# Patient Record
Sex: MALE | Race: OTHER
[De-identification: names, ages, dates, MRNs, and addresses within clinical notes are randomized per-mention and may not be internally consistent; named-entity substitution may affect disease eponyms.]

---

## 2019-04-01 ENCOUNTER — HOSPITAL ENCOUNTER (INPATIENT)
Dept: HOSPITAL 80 - FED | Age: 59
LOS: 9 days | Discharge: HOME | DRG: 853 | End: 2019-04-10
Attending: INTERNAL MEDICINE | Admitting: INTERNAL MEDICINE
Payer: COMMERCIAL

## 2019-04-01 DIAGNOSIS — E11.65: ICD-10-CM

## 2019-04-01 DIAGNOSIS — J96.01: ICD-10-CM

## 2019-04-01 DIAGNOSIS — E78.5: ICD-10-CM

## 2019-04-01 DIAGNOSIS — E87.6: ICD-10-CM

## 2019-04-01 DIAGNOSIS — Z79.4: ICD-10-CM

## 2019-04-01 DIAGNOSIS — D62: ICD-10-CM

## 2019-04-01 DIAGNOSIS — E87.1: ICD-10-CM

## 2019-04-01 DIAGNOSIS — J86.9: ICD-10-CM

## 2019-04-01 DIAGNOSIS — I10: ICD-10-CM

## 2019-04-01 DIAGNOSIS — A41.9: Primary | ICD-10-CM

## 2019-04-01 LAB — PLATELET # BLD: 278 10^3/UL (ref 150–400)

## 2019-04-01 PROCEDURE — G0378 HOSPITAL OBSERVATION PER HR: HCPCS

## 2019-04-01 RX ADMIN — HYDROCODONE BITARTRATE AND ACETAMINOPHEN PRN TAB: 5; 325 TABLET ORAL at 18:34

## 2019-04-01 RX ADMIN — GUAIFENESIN SCH MG: 600 TABLET, EXTENDED RELEASE ORAL at 19:47

## 2019-04-01 RX ADMIN — HYDROCODONE BITARTRATE AND ACETAMINOPHEN PRN TAB: 5; 325 TABLET ORAL at 18:01

## 2019-04-01 RX ADMIN — ACETAMINOPHEN PRN MG: 325 TABLET ORAL at 13:21

## 2019-04-01 RX ADMIN — KETOROLAC TROMETHAMINE PRN MG: 15 INJECTION, SOLUTION INTRAMUSCULAR; INTRAVENOUS at 19:47

## 2019-04-01 RX ADMIN — INSULIN LISPRO SCH: 100 INJECTION, SOLUTION INTRAVENOUS; SUBCUTANEOUS at 18:23

## 2019-04-01 RX ADMIN — HYDROCODONE BITARTRATE AND ACETAMINOPHEN PRN TAB: 5; 325 TABLET ORAL at 22:34

## 2019-04-01 NOTE — EDPHY
H & P


Stated Complaint: high blood sugar, rapid heart rate, fevers, feels weak and 

shakey


Time Seen by Provider: 04/01/19 08:26


HPI/ROS: 





CHIEF COMPLAINT:  Fever, shortness of breath





HISTORY OF PRESENT ILLNESS:  58-year-old male with diabetes presents with fever 

and shortness of breath.  Onset of left chest pain 10 days ago, associated with 

intermittent fever.  Gradually increasing shortness of breath.  Now short of 

breath at rest.  The left chest pain is severe, constant, 10/10 and increases 

with deep inspiration.  No cough.  Recently added insulin to DM regimen.  





REVIEW OF SYSTEMS:  complete 10 point ROS reviewed and is negative except for 

the noted elements in the HPI








- Personal History


Current Tetanus/Diphtheria Vaccine: Unsure


Current Tetanus Diphtheria and Acellular Pertussis (TDAP): Unsure





- Medical/Surgical History


Hx Asthma: No


Hx Chronic Respiratory Disease: No


Hx Diabetes: Yes


Hx Cardiac Disease: No


Hx Renal Disease: No


Hx Cirrhosis: No


Hx Alcoholism: No


Hx HIV/AIDS: No


Hx Splenectomy or Spleen Trauma: No





- Social History


Smoking Status: Never smoked


Alcohol Use: Sober


Drug Use: None





- Physical Exam


Exam: 





General Appearance:  Alert, pleasant


Eyes:  Pupils equal and round, no conjunctival pallor or injection


ENT, Mouth:  Mucous membranes moist


Neck:  Normal inspection


Respiratory:  Tachypneic, rales left lower lung field anteriorly


Cardiovascular:  Regular tachycardia


Gastrointestinal:  Abdomen is soft and nontender


Neurological:  A&O, nonfocal exam


Skin:  Warm and dry, no rash


Extremities:  Nontender, no pedal edema


Psychiatric:  Mood and affect normal





Constitutional: 


 Initial Vital Signs











Temperature (C)  38.9 C H  04/01/19 08:09


 


Heart Rate  106 H  04/01/19 08:09


 


Respiratory Rate  18   04/01/19 08:09


 


Blood Pressure  134/71 H  04/01/19 08:09


 


O2 Sat (%)  90 L  04/01/19 08:09








 











O2 Delivery Mode               Nasal Cannula


 


O2 (L/minute)                  3














Allergies/Adverse Reactions: 


 





No Known Allergies Allergy (Verified 04/01/19 09:52)


 








Home Medications: 














 Medication  Instructions  Recorded


 


Insulin Glargine [Lantus] 10 unit SC BID 04/01/19


 


Lisinopril/Hydrochlorothiazide 1 each PO DAILY 04/01/19





[Zestoretic 20-25 mg Tablet]  


 


Simvastatin [Zocor] 20 mg PO HS 04/01/19


 


glipiZIDE [Glucotrol] 5 mg PO BID 04/01/19


 


metFORMIN HCL [Glucophage 1000 mg] 1,000 mg PO BIDMEAL 04/01/19














Medical Decision Making





- Diagnostics


EKG Interpretation: 





EKG interpreted by me reveals normal sinus rhythm, rate 94, T-wave flattening 

in the inferior leads.  Interpretation:  Borderline EKG





Imaging Results: 


CXR: LLL infiltrate


Imaging: I viewed and interpreted images myself


ED Course/Re-evaluation: 





This patient presents with fever, hypoxia and left lower chest pain.  Clinical 

presentation consistent with pneumonia.  Meets SIRS criteria.  Initial lactate 

is normal.  IV normal saline 1 L and Tylenol 650 mg orally given.  Morphine 4 

mg IV given for pain.  Chest x-ray reveals a left lower lobe infiltrate.  Blood 

cultures were drawn and Rocephin and Zithromax IV given.  Ddimer slightly 

elevated, considered PE, pneumonia much more likely, consider CTA as inpt.  The 

hospitalist service was consulted for admission.





0945: feels and looks much better after fever reduction and pain control.  BP 

124/76, HR 80, RR 16, O2 sat 93% on 3l NC.





Differential Diagnosis: 





Differential diagnosis includes though it is not limited to pneumonia, 

pneumothorax, pulmonary embolism, aortic dissection, pericarditis, acute 

coronary syndrome.





- Data Points


Laboratory Results: 


 Laboratory Results





 04/01/19 08:20 





 04/01/19 08:20 








Medications Given: 


 





Acetaminophen (Tylenol)  650 mg PO Q4HRS PRN


   PRN Reason: Pain, Mild/Fever, Can Take PO


   Stop: 09/28/19 09:49


   Last Admin: 04/01/19 13:21 Dose:  650 mg


Hydrocodone Bitart/Acetaminophen (Norco 5/325)  1 - 2 tab PO Q4HRS PRN


   PRN Reason: Pain, Moderate Able to Take PO


   Stop: 04/11/19 17:43


   Last Admin: 04/02/19 19:43 Dose:  2 tab


Benzonatate (Tessalon Pearles)  200 mg PO TID PRN


   PRN Reason: Cough, Mild


   Stop: 09/28/19 13:56


   Last Admin: 04/02/19 13:03 Dose:  200 mg


Enoxaparin Sodium (Lovenox)  40 mg SC DAILY BAL


   Stop: 09/29/19 08:59


   Last Admin: 04/02/19 08:41 Dose:  40 mg


Guaifenesin (Mucinex)  600 mg PO BID [facility]


   Stop: 09/28/19 20:59


   Last Admin: 04/02/19 21:13 Dose:  600 mg


Cefepime HCl 2 gm/ Sodium (Chloride)  100 mls @ 200 mls/hr IV Q8HRS [facility]


   PRN Reason: Protocol


   Stop: 05/02/19 13:59


   Last Admin: 04/02/19 21:13 Dose:  100 mls


Vancomycin/Sodium Chloride (Vancomycin 1 Gm (Premix))  250 mls @ 250 mls/hr IV 

Q12H [facility]


   Stop: 05/02/19 13:59


   Last Admin: 04/02/19 16:12 Dose:  250 mls


Insulin Glargine (Lantus Syringe)  5 units SC BID [facility]


   Stop: 09/29/19 20:59


   Last Admin: 04/02/19 21:13 Dose:  5 units


Insulin Human Lispro (Humalog Lispro)  0 unit SC TIDMEAL [facility]


   PRN Reason: Protocol


   Stop: 09/28/19 17:59


   Last Admin: 04/02/19 18:09 Dose:  Not Given


Ketorolac Tromethamine (Toradol)  30 mg IVP Q6HRS PRN


   PRN Reason: Pain, Breakthrough


   Stop: 04/06/19 17:59


   Last Admin: 04/02/19 16:11 Dose:  30 mg


Senna/Docusate Sodium (Senokot-S)  1 - 2 tab PO BID [facility]


   PRN Reason: Protocol


   Stop: 09/29/19 08:59


   Last Admin: 04/02/19 21:13 Dose:  1 tab





Discontinued Medications





Acetaminophen (Tylenol)  650 mg PO EDNOW ONE


   Stop: 04/01/19 08:28


   Last Admin: 04/01/19 08:39 Dose:  650 mg


Azithromycin (Zithromax)  500 mg PO ONCE ONE


   Stop: 04/01/19 09:46


   Last Admin: 04/01/19 09:44 Dose:  500 mg


Azithromycin (Zithromax)  250 mg PO DAILY [facility]


   PRN Reason: Protocol


   Stop: 05/02/19 08:59


   Last Admin: 04/02/19 08:42 Dose:  250 mg


Sodium Chloride (Ns)  1,000 mls @ 0 mls/hr IV ONCE ONE; [facility]


   PRN Reason: Protocol


   Stop: 04/01/19 08:29


   Last Admin: 04/01/19 08:35 Dose:  1,000 mls


Ceftriaxone Sodium/Dextrose (Rocephin 1 Gm (Premix))  50 mls @ 100 mls/hr IV 

EDNOW ONE


   PRN Reason: Protocol


   Stop: 04/01/19 09:39


   Last Admin: 04/01/19 09:22 Dose:  50 mls


Ceftriaxone Sodium/Dextrose (Rocephin 1 Gm (Premix))  50 mls @ 100 mls/hr IV 

DAILY BAL


   PRN Reason: Protocol


   Stop: 05/02/19 08:59


   Last Admin: 04/02/19 09:48 Dose:  50 mls


Sodium Chloride (Ns)  1,000 mls @ 75 mls/hr IV CONT BAL


   Stop: 04/01/19 23:34


   Last Admin: 04/01/19 10:53 Dose:  1,000 mls


Ketorolac Tromethamine (Toradol)  15 mg IVP Q6HRS PRN


   PRN Reason: Pain, Breakthrough


   Stop: 04/06/19 17:59


   Last Admin: 04/01/19 13:48 Dose:  15 mg


Morphine Sulfate (Morphine)  4 mg IVP EDNOW ONE


   Stop: 04/01/19 08:37


   Last Admin: 04/01/19 08:40 Dose:  4 mg


Ondansetron HCl (Zofran)  4 mg IVP EDNOW ONE


   Stop: 04/01/19 08:37


   Last Admin: 04/01/19 08:40 Dose:  4 mg


Potassium Chloride (Klor-Con)  20 meq PO ONCE ONE


   Stop: 04/01/19 14:21


   Last Admin: 04/01/19 16:19 Dose:  20 meq


Potassium Chloride (Klor-Con)  40 meq PO ONCE ONE


   Stop: 04/02/19 09:54


   Last Admin: 04/02/19 12:05 Dose:  40 meq








Departure





- Departure


Disposition: [facility]


Clinical Impression: 


Pneumonia


Qualifiers:


 Pneumonia type: due to unspecified organism Laterality: left Lung location: 

lower lobe of lung Qualified Code(s): J18.1 - Lobar pneumonia, unspecified 

organism





Condition: Fair

## 2019-04-01 NOTE — GHP
[f 
rep st]



                                                            HISTORY AND PHYSICAL





DATE OF ADMISSION:  04/01/2019



CHIEF COMPLAINT:  Chest pain, fevers.



HISTORY OF PRESENT ILLNESS:  A 58-year-old male with diabetes, hyperlipidemia 
and hypertension brought in by family.  He is [place] speaking.  His son is 
bedside interpreting.  The patient does understand quite a bit of English.  
Reports a productive cough for the past week, brown sputum, fevers, chills, and 
sweats.  He presented to Urgent Care earlier this morning with severe left-
sided chest pain, difficulty breathing.  Complains of left-sided chest pain 
that is 10/10, worse with deep breath.  Next, recently saw PCP, who restarted 
glargine twice a day.  Denies ill contacts.  Complaining of headache, decreased 
oral intake this morning.  No nausea, vomiting, or diarrhea.  No dysuria.



REVIEW OF SYSTEMS:  I completed a 10-point review of systems, negative except 
as noted in HPI.



PAST MEDICAL HISTORY:  Hyperlipidemia, hypertension, diabetes.  Recent A1c 13.



PAST SURGICAL HISTORY:  Inguinal hernia.



FAMILY HISTORY:  No strokes or heart attacks.



SOCIAL HISTORY:  Lives in [place] with his wife.  Works for [employer].  No alcohol
, tobacco, or illicits.



HOME MEDICATIONS:  Lisinopril/hydrochlorothiazide 20/25 mg, metformin 1000 mg 
twice daily, glipizide 5 mg twice daily, Zocor 20 at bedtime, glargine 10 units 
subcu twice daily.



ALLERGIES:  None.



PHYSICAL EXAMINATION:  VITAL SIGNS:  Temperature 38.9, heart rate in the 90s, 
respirations 46, 83% on 8 L nasal cannula, blood pressure 146/90.  GENERAL:  He 
is ill-appearing, diaphoretic and uncomfortable.  HEENT:  PERRLA.  Dry mucous 
membranes.  CV:  Regular rate and rhythm.  Mildly reproductive left-sided chest 
pain, splinting using accessory muscles.  No lower extremity edema.  LUNGS:  
Decreased breath sounds, left base; crackles, right base.  ABDOMEN:  Soft, 
nontender, nondistended.  MUSCULOSKELETAL:  5/5 upper and lower extremity 
strength.  NEURO:  2 through 12 intact.  PSYCH:  Alert and oriented x3.



LABORATORY DATA:  WBC 13, hemoglobin 12, hematocrit 37, platelets 278.  Dimer 
is 0.74.  Lactate is 1.3.  Sodium 133, potassium 3.4, chloride 98, carbon 
dioxide 25, glucose 199, repeat 117, total bilirubin 2, AST 16, ALT 23, lipase 
26, procalcitonin is 0.23.  



Chest x-ray is personally reviewed by me.  Positive spine sign.  Left lower 
lobe infiltrate.  



EKG personally reviewed.  Normal sinus rhythm, first-degree heart block, LVH, T-
wave inversions 5, 6.



ASSESSMENT/PLAN:  

1.  Sepsis with elevated white count, fever, pneumonia.  Treat for community-
acquired pneumonia.  Blood cultures pending, negative respiratory PCR.  

2.  Community-acquired pneumonia:  Azithromycin, ceftriaxone, Tessalon Perles, 
guaifenesin for cough.

3.  Acute hypoxemic respiratory failure:  Splinting, using accessory muscles.  
Suspect secondary to pleuritic pain associated with pneumonia.  However, given 
high oxygen needs and mildly elevated D-dimer, we will check a CTA.

4.  Hyperlipidemia:  Statin.

5.  Diabetes, type 2.  A1c 13.  Hold orals with decreased oral intake.  Sliding 
scale for now.  We will re add glargine when eating more.

6.  Hypertension:  Hold hypertensive with sepsis.

7.  Diet:  Diabetic.

8.  Deep venous thrombosis prophylaxis:  Lovenox.  

9.  Hypovolemic hyponatremia:  Secondary to decreased oral intake.  Gentle 
intravenous fluids.

10.  

11.  Hypokalemia due to decreased oral intake:  Replete.



DISPOSITION:  Inpatient admission given acute sepsis warranting IV fluids, 
antibiotics.





Job #:  [id number]

MTDVERENICE

## 2019-04-01 NOTE — CPEKG
Test Reason : OPEN

Blood Pressure : ***/*** mmHG

Vent. Rate : 094 BPM     Atrial Rate : 094 BPM

   P-R Int : 200 ms          QRS Dur : 095 ms

    QT Int : 328 ms       P-R-T Axes : 055 003 007 degrees

   QTc Int : 411 ms

 

Sinus rhythm

Borderline prolonged MT interval

Borderline T wave abnormalities

 

Confirmed by Susi Mckeon ([id number]) on 4/1/2019 10:06:01 AM

 

Referred By: SUSI MCKEON           Confirmed By:Susi Mckeon

## 2019-04-02 LAB
INR PPP: 1.31 (ref 0.83–1.16)
PLATELET # BLD: 236 10^3/UL (ref 150–400)
PROTHROMBIN TIME: 15.7 SEC (ref 12–15)

## 2019-04-02 PROCEDURE — 0W9B3ZX DRAINAGE OF LEFT PLEURAL CAVITY, PERCUTANEOUS APPROACH, DIAGNOSTIC: ICD-10-PCS | Performed by: RADIOLOGY

## 2019-04-02 RX ADMIN — HYDROCODONE BITARTRATE AND ACETAMINOPHEN PRN TAB: 5; 325 TABLET ORAL at 13:03

## 2019-04-02 RX ADMIN — DEXTROSE MONOHYDRATE SCH MLS: 5 INJECTION, SOLUTION INTRAVENOUS at 21:13

## 2019-04-02 RX ADMIN — KETOROLAC TROMETHAMINE PRN MG: 15 INJECTION, SOLUTION INTRAMUSCULAR; INTRAVENOUS at 08:42

## 2019-04-02 RX ADMIN — HYDROCODONE BITARTRATE AND ACETAMINOPHEN PRN TAB: 5; 325 TABLET ORAL at 05:33

## 2019-04-02 RX ADMIN — DOCUSATE SODIUM AND SENNOSIDES SCH TAB: 50; 8.6 TABLET ORAL at 21:13

## 2019-04-02 RX ADMIN — GUAIFENESIN SCH MG: 600 TABLET, EXTENDED RELEASE ORAL at 21:13

## 2019-04-02 RX ADMIN — KETOROLAC TROMETHAMINE PRN MG: 15 INJECTION, SOLUTION INTRAMUSCULAR; INTRAVENOUS at 02:19

## 2019-04-02 RX ADMIN — Medication SCH MLS: at 16:12

## 2019-04-02 RX ADMIN — KETOROLAC TROMETHAMINE PRN MG: 15 INJECTION, SOLUTION INTRAMUSCULAR; INTRAVENOUS at 16:11

## 2019-04-02 RX ADMIN — ENOXAPARIN SODIUM SCH MG: 100 INJECTION SUBCUTANEOUS at 08:41

## 2019-04-02 RX ADMIN — INSULIN LISPRO SCH: 100 INJECTION, SOLUTION INTRAVENOUS; SUBCUTANEOUS at 08:02

## 2019-04-02 RX ADMIN — GUAIFENESIN SCH MG: 600 TABLET, EXTENDED RELEASE ORAL at 08:42

## 2019-04-02 RX ADMIN — DEXTROSE MONOHYDRATE SCH MLS: 5 INJECTION, SOLUTION INTRAVENOUS at 14:12

## 2019-04-02 RX ADMIN — HYDROCODONE BITARTRATE AND ACETAMINOPHEN PRN TAB: 5; 325 TABLET ORAL at 19:43

## 2019-04-02 RX ADMIN — BENZONATATE PRN MG: 100 CAPSULE, LIQUID FILLED ORAL at 13:03

## 2019-04-02 RX ADMIN — INSULIN LISPRO SCH UNITS: 100 INJECTION, SOLUTION INTRAVENOUS; SUBCUTANEOUS at 13:13

## 2019-04-02 RX ADMIN — DOCUSATE SODIUM AND SENNOSIDES SCH TAB: 50; 8.6 TABLET ORAL at 08:41

## 2019-04-02 RX ADMIN — INSULIN LISPRO SCH: 100 INJECTION, SOLUTION INTRAVENOUS; SUBCUTANEOUS at 18:09

## 2019-04-02 NOTE — PDMN
Medical Necessity


Medical necessity: Change to inpt as of 4/2/19 @ 12:35, meets inpt criteria per 

MD order and M-282, Pneumonia, A-2 days, upgraded to inpt for persistent AHRF/

SOB beyond 24 hr OBS window still requiring 4-8L O2 via oxymask, fwbrile today 

at 100.9 and w/persistent pleuritic CP due to PNA/parapneumonic effusion. 58 y/

o w/diabetes, HTN, and HLD initially admitted w/sepsis in setting of community 

acquired PNA. IV ABX's, IV Toradol for pain. Anticipate>2MN for ongoing eval/

treatment of above.

## 2019-04-02 NOTE — ASMTCMCOM
CM Note

 

CM Note                       

Notes:

CM spoke with pt and family in the room. Pt admitted for Community Acquired Pneumonia/sepsis, blood 


cultures pending, and lives independently with his wife. PT is ordered and pending. Pt likely to 

d/c independently. Pt speaks Upper sorbian and son speaks English. CM to follow. 



D/C Plan: Likely independent

 

Date Signed:  04/02/2019 02:26 PM

Electronically Signed By:Kelin Price

## 2019-04-02 NOTE — HOSPPROG
Hospitalist Progress Note


Assessment/Plan: 








#CAP with parapneumonic effusion (personally-reviewed CT and no loculations)


-recurrent fever this afternoon. Broaden abx, will obtain thoracentesis for 

diagnostic purposes. Add TB studies (been in [place] 1996, from [place])


-blood/sputum cultures pending





#Sepsis: due to above





#Acute hypoxemic resp failure: from PNA, splinting. Down to 4L


-BNP minimally elevated





#Pleuritic CP: due to PNA/effuision. Negative trop. Toradol, Norco helpful





#DM2: not eating much, hold orals. SSI, add glargine when eating more





#HTN: holding BP meds





#Hypokalemia: repleted





#Diet: DM





#DVT ppx: Lovenox





#Disp: inpatient admission for ongoing fever, IV abx and thoracentesis


Subjective: pain better-controlled this morning


Objective: 


 Vital Signs











Temp Pulse Resp BP Pulse Ox


 


 36.8 C   73   16   120/68   93 


 


 04/02/19 07:53  04/02/19 07:53  04/02/19 07:53  04/02/19 07:53  04/02/19 07:53








 Laboratory Results





 04/02/19 05:45 





 











 04/01/19 04/02/19 04/03/19





 05:59 05:59 05:59


 


Intake Total  1940 


 


Balance  1940 














- Time Spent With Patient


Time Spent with Patient: greater than 35 minutes


Time Spent with Patient: Greater than 35 minutes spent on this patients care, 

greater than 50% of time spent counseling, educating, and coordinating care 

regarding the above mentioned plan.





- Physical Exam


Constitutional: no apparent distress


Eyes: PERRL


Ears, Nose, Mouth, Throat: moist mucous membranes


Cardiovascular: regular rate and rhythym, other (reproducible left chest pain), 

No edema


Respiratory: no respiratory distress, other (decreased BS left base, right base 

with mild crackles)


Gastrointestinal: normoactive bowel sounds


Genitourinary: no bladder fullness


Skin: warm





ICD10 Worksheet


Patient Problems: 


 Problems











Problem Status Onset


 


Pneumonia Acute

## 2019-04-03 LAB — PLATELET # BLD: 235 10^3/UL (ref 150–400)

## 2019-04-03 RX ADMIN — Medication SCH MLS: at 02:06

## 2019-04-03 RX ADMIN — DEXTROSE MONOHYDRATE SCH MLS: 5 INJECTION, SOLUTION INTRAVENOUS at 15:48

## 2019-04-03 RX ADMIN — INSULIN LISPRO SCH UNITS: 100 INJECTION, SOLUTION INTRAVENOUS; SUBCUTANEOUS at 08:01

## 2019-04-03 RX ADMIN — INSULIN LISPRO SCH: 100 INJECTION, SOLUTION INTRAVENOUS; SUBCUTANEOUS at 18:06

## 2019-04-03 RX ADMIN — HYDROCODONE BITARTRATE AND ACETAMINOPHEN PRN TAB: 5; 325 TABLET ORAL at 08:01

## 2019-04-03 RX ADMIN — ACETAMINOPHEN PRN MG: 325 TABLET ORAL at 04:58

## 2019-04-03 RX ADMIN — ENOXAPARIN SODIUM SCH MG: 100 INJECTION SUBCUTANEOUS at 08:01

## 2019-04-03 RX ADMIN — DEXTROSE MONOHYDRATE SCH MLS: 5 INJECTION, SOLUTION INTRAVENOUS at 05:01

## 2019-04-03 RX ADMIN — KETOROLAC TROMETHAMINE PRN MG: 15 INJECTION, SOLUTION INTRAMUSCULAR; INTRAVENOUS at 13:47

## 2019-04-03 RX ADMIN — INSULIN GLARGINE SCH UNITS: 100 INJECTION, SOLUTION SUBCUTANEOUS at 10:17

## 2019-04-03 RX ADMIN — GUAIFENESIN SCH MG: 600 TABLET, EXTENDED RELEASE ORAL at 08:02

## 2019-04-03 RX ADMIN — DOCUSATE SODIUM AND SENNOSIDES SCH TAB: 50; 8.6 TABLET ORAL at 21:25

## 2019-04-03 RX ADMIN — HYDROCODONE BITARTRATE AND ACETAMINOPHEN PRN TAB: 5; 325 TABLET ORAL at 23:22

## 2019-04-03 RX ADMIN — INSULIN GLARGINE SCH UNITS: 100 INJECTION, SOLUTION SUBCUTANEOUS at 21:46

## 2019-04-03 RX ADMIN — GUAIFENESIN SCH MG: 600 TABLET, EXTENDED RELEASE ORAL at 21:25

## 2019-04-03 RX ADMIN — BENZONATATE PRN MG: 100 CAPSULE, LIQUID FILLED ORAL at 12:34

## 2019-04-03 RX ADMIN — ATORVASTATIN CALCIUM SCH MG: 10 TABLET, FILM COATED ORAL at 21:25

## 2019-04-03 RX ADMIN — DOXYCYCLINE SCH MLS: 100 INJECTION, POWDER, LYOPHILIZED, FOR SOLUTION INTRAVENOUS at 13:48

## 2019-04-03 RX ADMIN — HYDROCODONE BITARTRATE AND ACETAMINOPHEN PRN TAB: 5; 325 TABLET ORAL at 18:17

## 2019-04-03 RX ADMIN — DEXTROSE MONOHYDRATE SCH MLS: 5 INJECTION, SOLUTION INTRAVENOUS at 23:05

## 2019-04-03 RX ADMIN — INSULIN LISPRO SCH: 100 INJECTION, SOLUTION INTRAVENOUS; SUBCUTANEOUS at 21:25

## 2019-04-03 RX ADMIN — INSULIN LISPRO SCH UNITS: 100 INJECTION, SOLUTION INTRAVENOUS; SUBCUTANEOUS at 12:34

## 2019-04-03 RX ADMIN — DOCUSATE SODIUM AND SENNOSIDES SCH TAB: 50; 8.6 TABLET ORAL at 08:02

## 2019-04-03 RX ADMIN — DOXYCYCLINE SCH MLS: 100 INJECTION, POWDER, LYOPHILIZED, FOR SOLUTION INTRAVENOUS at 21:20

## 2019-04-03 NOTE — HOSPPROG
Hospitalist Progress Note


Assessment/Plan: 








#CAP with parapneumonic effusion (personally-reviewed CT and no loculations)


-600cc taken off by IR 4/2, abx broadened. Cultures, AFB studies, cytology 

pending (from [facility], here since 1996). Add Quantiferon 


-appreciate Dr. Roy consult.


-Stop Vanc, add Doxy for atypical, cont Cefepime


-no vegetations on echo. 


-blood/sputum cultures pending





#Sepsis: due to above





#Acute hypoxemic resp failure: from PNA, splinting. Down to 3L


-BNP minimally elevated





#Pleuritic CP: due to PNA/effusion. Negative trop. Toradol, Norco helpful





#DM2: not eating much, hold orals. SSI, add glargine when eating more





#HTN: holding BP meds





#Hypokalemia: repleted





#Diet: DM





#DVT ppx: Lovenox





#Disp: inpatient admission for ongoing fever, IV abx and thoracentesis


Subjective: febrile overnight. Less chest pain and breathing easier today


Objective: 


 Vital Signs











Temp Pulse Resp BP Pulse Ox


 


 36.5 C   74   18   132/74 H  94 


 


 04/03/19 07:50  04/03/19 07:50  04/03/19 07:50  04/03/19 07:50  04/03/19 07:50








 Microbiology











 04/02/19 12:30  - Final





 Sputum, Expectorated 


 


 04/02/19 16:25 Gram Stain - Final





 Thoracic Fluid - Aspirate 








 Laboratory Results





 04/03/19 04:46 





 04/03/19 04:46 





 











 04/02/19 04/03/19 04/04/19





 05:59 05:59 05:59


 


Intake Total 1940 500 


 


Output Total  1000 


 


Balance 1940 -500 








 











PT  15.7 SEC (12.0-15.0)  H  04/02/19  14:05    


 


INR  1.31  (0.83-1.16)  H  04/02/19  14:05    














- Time Spent With Patient


Time Spent with Patient: greater than 35 minutes


Time Spent with Patient: Greater than 35 minutes spent on this patients care, 

greater than 50% of time spent counseling, educating, and coordinating care 

regarding the above mentioned plan.





- Physical Exam


Constitutional: no apparent distress, other (appears brighter today)


Eyes: PERRL


Ears, Nose, Mouth, Throat: moist mucous membranes


Cardiovascular: regular rate and rhythym, other (reproducible left-sided CP)


Respiratory: no respiratory distress


Gastrointestinal: normoactive bowel sounds, soft, non-tender abdomen


Genitourinary: no bladder fullness


Skin: warm


Musculoskeletal: full muscle strength, other (no joint swelling/redness)


Neurologic: AAOx3, CN II-XII Intact


Psychiatric: interacting appropriately





ICD10 Worksheet


Patient Problems: 


 Problems











Problem Status Onset


 


Pneumonia Acute

## 2019-04-03 NOTE — ECHO
[email or web address]





[address] [address] 

Main: [phone] 



Echocardiography Examination 

Transthoracic 



Name:            CRISSY BARRY                           MR#:

[id number]

Study Date:      04/03/2019                             Study Time:

09:59 AM

YOB: 1960                             Age:

58 year(s)

Height:          167.6 cm (66 in.)                      Weight:

63.96 kg (141 lb.)

BSA:             1.72 m2                                Gender:

Male

Examination:     Echo                                   Contrast: 

Image Quality:   Adequate                               Rhythm: 

Heart Rate:                                             BP:

132 mmHg/74 mmHg

Indication:      Murmur, persistent fever, eval for endocarditis 



Procedure Staff 

Referring Physician: 

Echocardiographer:         Kimberly Trejo [facility] 

Reading Physician:         Bryson Carr MD 

Requesting Provider: 

Indication:                Murmur, persistent fever, eval for

endocarditis



Measurements 

Chambers                                           AV/MV 

Label                 Value       Normal Value          Label

Value       Normal Value

LVOTd                2.1 cm       (1.9cm - 2.1cm)       AV PGmax

12 mmHg

LVDd, 2D             4.8 cm       (4.2cm - 5.9cm)       AV PGmean

7 mmHg

LVDs, 2D             3 cm         (2.1cm - 4cm)         AV Vmax

1.75 m/s

IVSd, 2D             1.3 cm       (0.6cm - 1.1cm)       SHAJI (VTI)

2.2 cm2

LVPWd, 2D            1.2 cm       (0.6cm - 1cm)         MV E Vmax

0.85 m/s

LVEF, BP             66 %         (55% - 70%)           MV A Vmax

0.72 m/s

LVEF, 2D             67 %         (54% - 74%)           MV E/A

1.18

LVOT PGmean          4 mmHg                             MV E/E'

lateral      9.6

LVOT Vmean           0.91 m/s                           MV E/E' septal

9.4 (0.5 - 1.7)

RVDd, 2D             3.2 cm       (1.9cm - 3.8cm)       MV DT

197 ms

LA Volume, BP        50 ml        (18ml - 58ml)         MV E' septal

0.09 m/s

LADs, 2D             3.8 cm       (3cm - 4cm)           MV PHT

0.06 s

LAESV index, BP      29.1 ml/m2                         MVA PHT

3.9 cm2

RA Area              15 cm2                             MR Reg. Volume

37 ml

Additional Vessels                                      MR Vmax

5.49 m/s

Label                 Value       Normal Value          MR VTI

170 cm

AoAsc                3.6 cm                             MR (ERO)

0.22 cm2

AoRoot, 2D           3.6 cm       (1.4cm - 2.6cm)       MV E' lateral

0.09 m/s



MR PISA Radius       0.7 cm 

MV E/E' mean         9.44  



Patient: CRISSY BARRY                        MRN: [id number]

Study Date: 04/03/2019   Page 1 of 3

09:59 AM 









MR PISA Alias V. 38.5 cm/s 

MV PHT               56 ms 

MV E' mean           0.09 m/s 

TV/PV 

Label                 Value       Normal Value 

RA Pressure          5 mmHg 

RVSP                 37 mmHg 

TR Pmax              32 mmHg 

TR Vmax              2.84 m/s 

PV PGmax             4 mmHg 

PV Vmax, Caliper     1.03 m/s     (0.6m/s - 0.9m/s) 



Conclusions 

(1)  Left ventricular systolic ejection fraction was normal (65-70%) 

- normal wall motion 

- mild concentric LVH 

(2)  Normal RV size and function 

(3)  Normal atrial dimensions 

(4)  Mild to moderate MR with mild MAC 

(5)  Trileaflet aortic valve with mild AI and mild sclerosis (no

stenosis)

(6)  Mild to moderate TR 

- RVSP was normal 37 mm Hg 

(7)  Mild PI 

(8)  Normal aorta dimensions 

(9)  No pericardial effusion 

(10)  If clinically indicated, would consider MAURI to better visualize

the valve morphologies



Findings 

No obvious evidence of endocarditis, suggest MAURI if clinically

indicated.

Left Ventricle: 

Left ventricle is normal in size. Normal global systolic left

ventricular function. The ejection fraction, measured by

Simpsons method, is 66 %. There is mild concentric left ventricular

hypertrophy. There are no regional wall motion

abnormalities. Cannot determine LAP and Diastolic Dysfunction Grade.  

Right Ventricle: 

Normal size right ventricle. Right ventricular systolic function is

normal.

Left Atrium: 

The left atrium is normal in size.  

Right Atrium: 

The right atrium is normal in size.  

Mitral Valve: 

Mitral valve appears structurally normal. Mild to moderate mitral

regurgitation. No mitral valve stenosis. There is mild

mitral annular calcification.  

Aortic Valve: 

Aortic leaflets are structurally normal. Mild aortic regurgitation is

present. There is no aortic stenosis. There is aortic

sclerosis present.  

Tricuspid Valve: 

Tricuspid valve leaflets are structurally normal. Mild to moderate

tricuspid regurgitation. No tricuspid valve stenosis.

Right Ventricular systolic pressure is measured at 37 mmHg. Pulmonary

artery pressure slightly increased.

Pulmonic Valve: 

Pulmonic leaflets are structurally normal. Mild pulmonic valve

regurgitation is present.

Aorta: 

The aortic root size in 2D measures 3.6 cm. The ascending aorta

measures 3.6 cm.



Patient: CRISSY BARRY                        MRN: [id number]

Study Date: 04/03/2019   Page 2 of 3

09:59 AM 









Aorta Measurements 

AoRoot, 2D is 3.6 cm.  

Pericardium: 

No pericardial effusion.  



Exam Details 

Procedure Ordered:         Echo 

Procedure Status:          Routine study 

Image Quality:             Adequate 

Facility Location:         Cardiac Echo 1 



Electronically signed by Bryson Carr MD on 04/03/2019 at 12:36 PM 

(No Signature Object) 



Patient: CRISSY BARRY                        MRN: [id number]

Study Date: 04/03/2019   Page 3 of 3

09:59 AM 







[id number]

## 2019-04-03 NOTE — PDCONSULT
Consultant Note: 


Infectious Diseases Consult Note





Impression:  58-year-old man with approximately 2 weeks progressive cough and 

fever manifesting as pneumonia on imaging.  Given the 2 weeks without 

progression to severe sepsis or septic shock suspected atypical community onset 

pathogen particularly Legionella or mycoplasma.  Expect fevers to persist over 

the next 24-48 hours with the addition of doxycycline targeting these atypical 

pathogens.  He did have a sick contact at work that is the likely source of his 

infection.  Although less likely, he is from an endemic country for 

tuberculosis and has never been tested to determine if he has latent disease.  

Will send a QuantiFERON although this will not determine active disease if 

positive, his imaging is not consistent with reactivation of TB and a 

predominantly immunocompetent person.





1. Community onset pneumonia, left lower lobe; causing hypoxia necessitating 

supplemental oxygen


2. Pericardial effusion, likely secondary to atypical bacterial pathogen


3. Left lung parapneumonic effusion


4. Diabetes mellitus





Plan: 


1. Stop vancomycin


2. Start doxycycline 100 mg IV twice daily


3. Continue cefepime


4. Reviewed in detail potential side effects of doxycycline to include: allergy

, rash, nausea, antibiotic-associated diarrhea, Clostridioides difficile colitis

, photosensitivity, heart burn, pigmented rash.


5. Reviewed in detail potential side effects of beta-lactam antibiotics to 

include: allergy, rash, nausea, antibiotic-associated diarrhea, Clostridioides 

difficile colitis.


6. Add a QuantiFERON test to tomorrow's labs





Yevgeniy Roy MD


Infectious Diseases





Chief Complaint: Fever and cough for two weeks


Requesting Provider: Dr. Kapoor


Reason for Referral: Consultation was requested by Dr. Kapoor regarding 

antimicrobial management.





HPI:  58-year-old man who presented to the hospital approximately 2 weeks after 

developing fevers with chills, nonproductive cough, and headaches.  He notes 

that a co-worker that he interacts within a daily basis with sick in the week 

prior to the development of his symptoms.  He presented to hospital because he 

was having increasing shortness of breath in addition to the fevers are not 

resolving.  Since admission he overall feels improved but has persisting 

fevers.  His cough is unchanged and remains nonproductive, but the intensity of 

his cough has decreased.  No rash, diarrhea, or other new symptoms since 

admission.  He notes no known family member or friend was diagnosed with 

tuberculosis that he is aware of while living in [facility] prior to immigrating to 

[place] In late 90s and none since.





Chronology of Present Illness: 





Location of symptoms:  Lungs


Onset of symptoms:  Approximately 2 weeks prior to admission


Initial signs/symptoms:  Fever with chills, nonproductive cough; headache


Associated signs/symptoms at onset:  No rash, myalgias, or arthralgias


Changes since onset:  Bilateral lower thoracic chest pain predominantly with 

cough, unchanged cough, headaches have resolved; overall feels better


Exacerbating factors:  None identified


Relieving factors:  Antibiotic therapy


Antibiotics since symptom onset:  Ceftriaxone, azithromycin, vancomycin, 

cefepime


Change in symptoms with antibiotics:  Overall improved but with fevers 

persisting


Relevant social history:  From [facility] in does travel back periodically to see 

family; most recent return to [facility] in 2016


Relevant PMHx/PSHx:  Diabetes mellitus which places him at higher than average 

risk for reactivation of latent tuberculosis





Reviewed patient medical records in [facility], and [facility]





Travel history:  Originally from [facility], moved to the [place] in the late 

90s, periodically returns to visit family in [facility] with most recent visit in 

2016 for 2 and half months





Past Medical History:  Diabetes mellitus, hypertension





Past Surgical History:  No thoracic surgeries in the past





Social History: Does not use tobacco products; Does not consume marijuana 

products; Drinks alcohol socially; Does not use any other drugs currently or in 

the past





Family History:  No family members with recurrent infections





Allergies: NKDA





Medications: Reviewed in medical record and confirmed with patient.





ROS: 10 organ systems reviewed; pertinent positives and negatives listed in the 

HPI, all other organ systems negative.





Physical Exam: 


VS: Reviewed


Gen: No acute distress; Breathing comfortably with supplemental oxygen; Able to 

speak in complete sentences


Eyes: No conjunctival injection; No scleral icterus


HENT: No gross deformities


Neck: No limitation in range of motion


Pulm: Audible inspiratory sounds to the base on the right; diminished 

inspiratory sounds at the left base; No wheeze, rhonchi, or rales


CV: Normal S1 and S2; Regular rate and rhythm; No murmurs, rubs, or gallops; No 

lower extremity edema


Abd: Not distended; Normo-active bowel sounds; Soft; Non-tender


Skin: A full skin exam including exposed bilateral upper extremities, bilateral 

lower extremities to the knees, face, neck, abdomen, chest, and back performed; 

Skin intact, warm, with no rash


MSK: Joints without erythema or edema; No gross limitation in range of motion


Ext: No clubbing or cyanosis


Neuro: Awake and alert


Psych: Normal mood and affect





Labs/Imaging: 


All microbiology testing (culture and non-culture) reviewed in the medical 

record.


Personally reviewed and interpreted the images of the following radiographs:  

Chest CT showing pericardial effusion, interstitial infiltrates in the left 

lower lobe, no dense consolidations





Medications











Generic Name Dose Route Start Last Admin





  Trade Name Freq  PRN Reason Stop Dose Admin


 


Cefepime HCl 2 gm/ Sodium  100 mls @ 200 mls/hr  04/02/19 14:00  04/03/19 05:01





  Chloride  IV  05/02/19 13:59  100 mls





  Q8HRS BAL   





  Protocol   


 


Doxycycline Hyclate 100 mg/  260 mls @ 260 mls/hr  04/03/19 10:30  





  Sodium Chloride  IV  05/03/19 10:29  





  Q12HRS BAL   





  Protocol   














Discontinued Medications














Generic Name Dose Route Start Last Admin





  Trade Name Freq  PRN Reason Stop Dose Admin


 


Azithromycin  250 mg  04/02/19 09:00  04/02/19 08:42





  Zithromax  PO  05/02/19 08:59  250 mg





  DAILY BAL   





  Protocol   


 


Ceftriaxone Sodium/Dextrose  50 mls @ 100 mls/hr  04/02/19 09:00  04/02/19 09:48





  Rocephin 1 Gm (Premix)  IV  05/02/19 08:59  50 mls





  DAILY BAL   





  Protocol   


 


Vancomycin/Sodium Chloride  250 mls @ 250 mls/hr  04/02/19 14:00  04/03/19 02:06





  Vancomycin 1 Gm (Premix)  IV  05/02/19 13:59  250 mls





  Q12H [facility]   








Microbiology





04/02/19 16:25   Thoracic Fluid - Aspirate   Gram Stain - Final


04/02/19 12:30   Sputum, Expectorated    - Final


04/01/19 08:50   Nasal, Sinus - Swab   Respiratory Panel (PCR) - Final


                              No Organism Detected By Pcr


04/02/19 16:25   Thoracic Fluid - Aspirate   Fungal Culture - Preliminary


04/02/19 12:30   Sputum, Expectorated   Sputum Culture - Preliminary


04/01/19 08:30   Blood   Blood Culture - Preliminary


04/01/19 08:20   Blood   Blood Culture - Preliminary





 Laboratory Tests











  04/01/19 04/02/19 04/02/19





  08:20 05:45 09:50


 


WBC  13.82 H  14.26 H 


 


Hgb  12.7 L  10.8 L 


 


Plt Count  278  236 


 


INR   


 


Creatinine   


 


Conjugated Bilirubin    0.5


 


Unconjugated Bilirubin    1.1


 


AST    19


 


ALT    27


 


Urine Legionella Ag   


 


Ur Strep pneumoniae Ag   














  04/02/19 04/02/19 04/03/19





  14:05 18:00 04:46


 


WBC    12.83 H


 


Hgb    10.3 L


 


Plt Count    235


 


INR  1.31 H  


 


Creatinine   


 


Conjugated Bilirubin   


 


Unconjugated Bilirubin   


 


AST   


 


ALT   


 


Urine Legionella Ag   Pending 


 


Ur Strep pneumoniae Ag   Pending 














  04/03/19





  04:46


 


WBC 


 


Hgb 


 


Plt Count 


 


INR 


 


Creatinine  0.6 L


 


Conjugated Bilirubin 


 


Unconjugated Bilirubin 


 


AST 


 


ALT 


 


Urine Legionella Ag 


 


Ur Strep pneumoniae Ag 














Ongoing monitoring for antimicrobial toxicity with: CBC, BMP.





Face-to-face time with patient: 68 minutes with >50% of face-to-face time spent 

in counseling, patient education, and coordinating care. Counseling provided 

included the microbiology of community onset pneumonia including typical and 

atypical pathogens, risk for tuberculosis in a person from an endemic country, 

testing for tuberculosis including limitations, expected time to resolution, 

natural history without treatment, and side effects of treatment.

## 2019-04-04 LAB — PLATELET # BLD: 247 10^3/UL (ref 150–400)

## 2019-04-04 RX ADMIN — ACETAMINOPHEN PRN MG: 325 TABLET ORAL at 04:28

## 2019-04-04 RX ADMIN — ACETAMINOPHEN PRN MG: 325 TABLET ORAL at 12:12

## 2019-04-04 RX ADMIN — INSULIN LISPRO SCH UNITS: 100 INJECTION, SOLUTION INTRAVENOUS; SUBCUTANEOUS at 13:07

## 2019-04-04 RX ADMIN — INSULIN GLARGINE SCH UNITS: 100 INJECTION, SOLUTION SUBCUTANEOUS at 21:36

## 2019-04-04 RX ADMIN — ENOXAPARIN SODIUM SCH MG: 100 INJECTION SUBCUTANEOUS at 08:30

## 2019-04-04 RX ADMIN — HYDROCODONE BITARTRATE AND ACETAMINOPHEN PRN TAB: 5; 325 TABLET ORAL at 09:02

## 2019-04-04 RX ADMIN — DOXYCYCLINE SCH MLS: 100 INJECTION, POWDER, LYOPHILIZED, FOR SOLUTION INTRAVENOUS at 21:40

## 2019-04-04 RX ADMIN — ATORVASTATIN CALCIUM SCH MG: 10 TABLET, FILM COATED ORAL at 21:38

## 2019-04-04 RX ADMIN — INSULIN LISPRO SCH: 100 INJECTION, SOLUTION INTRAVENOUS; SUBCUTANEOUS at 07:49

## 2019-04-04 RX ADMIN — INSULIN GLARGINE SCH UNITS: 100 INJECTION, SOLUTION SUBCUTANEOUS at 09:00

## 2019-04-04 RX ADMIN — DOXYCYCLINE SCH MLS: 100 INJECTION, POWDER, LYOPHILIZED, FOR SOLUTION INTRAVENOUS at 08:36

## 2019-04-04 RX ADMIN — GUAIFENESIN SCH MG: 600 TABLET, EXTENDED RELEASE ORAL at 08:34

## 2019-04-04 RX ADMIN — DEXTROSE MONOHYDRATE SCH MLS: 5 INJECTION, SOLUTION INTRAVENOUS at 21:49

## 2019-04-04 RX ADMIN — GUAIFENESIN SCH MG: 600 TABLET, EXTENDED RELEASE ORAL at 21:38

## 2019-04-04 RX ADMIN — INSULIN LISPRO SCH: 100 INJECTION, SOLUTION INTRAVENOUS; SUBCUTANEOUS at 17:44

## 2019-04-04 RX ADMIN — IBUPROFEN PRN MG: 600 TABLET ORAL at 16:51

## 2019-04-04 RX ADMIN — DEXTROSE MONOHYDRATE SCH MLS: 5 INJECTION, SOLUTION INTRAVENOUS at 13:21

## 2019-04-04 RX ADMIN — INSULIN LISPRO SCH UNITS: 100 INJECTION, SOLUTION INTRAVENOUS; SUBCUTANEOUS at 21:37

## 2019-04-04 RX ADMIN — FAMOTIDINE SCH MG: 20 TABLET, FILM COATED ORAL at 21:38

## 2019-04-04 RX ADMIN — HYDROCODONE BITARTRATE AND ACETAMINOPHEN PRN TAB: 5; 325 TABLET ORAL at 21:38

## 2019-04-04 RX ADMIN — DOCUSATE SODIUM AND SENNOSIDES SCH TAB: 50; 8.6 TABLET ORAL at 21:38

## 2019-04-04 RX ADMIN — DEXTROSE MONOHYDRATE SCH MLS: 5 INJECTION, SOLUTION INTRAVENOUS at 06:22

## 2019-04-04 RX ADMIN — DOCUSATE SODIUM AND SENNOSIDES SCH TAB: 50; 8.6 TABLET ORAL at 08:35

## 2019-04-04 NOTE — ASMTCMCOM
CM Note

 

CM Note                       

Notes:

Spoke w/pt's son José regarding FMLA paperwork. I have notified MD but it is not yet signed. Pt 

still with high fevers, being worked up for possible TB. He was otherwise cleared by PT for home.



DC Plan: TBD

 

Date Signed:  04/04/2019 04:27 PM

Electronically Signed By:Vickie Jessica RN

## 2019-04-04 NOTE — HOSPPROG
Hospitalist Progress Note


Assessment/Plan: 








#CAP/left parapneumonic effusion (personally-reviewed CT and no loculations)


-thoracentesis 4/2. IV Doxy and Cefepime


-still febrile so lab testing for latent TB, HIV


-no vegetations on echo. Negative Strep Ag. Negative cytology


-blood/sputum cultures pending





#Sepsis: due to above





#Acute hypoxemic resp failure: improved after thora


-BNP minimally elevated. Normal LVEF, mild MR/TR





#Pleuritic CP: due to PNA/effusion. Negative trop. PRN Advil





#DM2: not eating much, hold orals. SSI, add glargine when eating more





#HTN: holding BP meds





#Hypokalemia: repleted





#Hyponatremia: mild. Encourage PO intake





#Diet: DM





#DVT ppx: Lovenox





#Disp: inpatient admission for ongoing fever, IV abx and thoracentesis


Subjective: less chest pain


Objective: 


 Vital Signs











Temp Pulse Resp BP Pulse Ox


 


 39.3 C H  92   36 H  149/88 H  90 L


 


 04/04/19 16:00  04/04/19 16:00  04/04/19 16:00  04/04/19 16:00  04/04/19 16:00








 Microbiology











 04/04/19 09:36  - Final





 Sputum, Induced/Suctioned 


 


 04/02/19 16:25 Gram Stain - Final





 Thoracic Fluid - Aspirate 


 


 04/02/19 12:30  - Final





 Sputum, Expectorated 


 


 04/02/19 16:25 Mycobacterial Smear (GHASSAN) - Final





 Thoracic Fluid - Aspirate 








 Laboratory Results





 04/04/19 05:15 





 04/04/19 05:15 





 











 04/03/19 04/04/19 04/05/19





 05:59 05:59 05:59


 


Intake Total 500  


 


Output Total 1000  


 


Balance -500  








 











PT  15.7 SEC (12.0-15.0)  H  04/02/19  14:05    


 


INR  1.31  (0.83-1.16)  H  04/02/19  14:05    














- Time Spent With Patient


Time Spent with Patient: greater than 35 minutes


Time Spent with Patient: Greater than 35 minutes spent on this patients care, 

greater than 50% of time spent counseling, educating, and coordinating care 

regarding the above mentioned plan.





- Physical Exam


Constitutional: no apparent distress


Eyes: PERRL


Ears, Nose, Mouth, Throat: moist mucous membranes


Cardiovascular: regular rate and rhythym


Respiratory: other (crackles right base)


Gastrointestinal: normoactive bowel sounds


Genitourinary: no bladder fullness


Skin: warm, No induration, No rash


Musculoskeletal: full muscle strength, other (no reproducible CP )


Neurologic: AAOx3, CN II-XII Intact


Psychiatric: interacting appropriately





ICD10 Worksheet


Patient Problems: 


 Problems











Problem Status Onset


 


Pneumonia Acute

## 2019-04-04 NOTE — PCMIDPN
Assessment/Plan: 


Assessment: 58-year-old man with community onset pneumonia manifesting with 

ongoing high temperature spikes without a tachycardic response, acute non blood 

loss anemia, normal LFTs, normal platelet count, pericardial effusion, 

parapneumonic pleural effusion, and hyponatremia.  His overall syndrome is 

consistent with an atypical pneumonia pathogen most likely Legionella or 

mycoplasma.  This could represent reactivation of tuberculosis is he is from 

[facility] with frequent travel back to his home country, in no previous testing for 

latent tuberculosis.  His risk factors for reactivation of TB are age and the 

presence of diabetes.  Due to his ongoing fevers pulmonary disease and overall 

risk we will put him into airborne isolation and test for tuberculosis.





1. Community onset pneumonia, LLL


2. Hypoxia secondary to #1 requiring supplemental oxygen


3. Acute anemia without blood loss


4. Diabetes mellitus


5. Hyponatremia


6. Left-sided parapneumonic effusion


7. Pericardial effusion





Plan:


1. Continue doxycycline 100mg IV BID


2. Continue cefepime


3. Airborne isolation for TB evaluation


4. Induced sputum for TB PCR testing


5. Added HIV testing to labs


6. Quantiferon test pending


7. Added LDH and haptoglobin to tomorrow's labs


8. Repeat LFTs with tomorrow's labs


9. Added ADA testing to pleural fluid sample


10. CBC with differential with tomorrow's labs





Yevgeniy Roy MD


Infectious Diseases





04/04/19 10:51





Subjective: 


Ongoing fevers overnight with a temperature spike associated with diaphoresis, 

chills and generally feeling poorly.  No new rashes, arthralgias, myalgias, or 

diarrhea.  Cough is unchanged and remains on productive.  No new symptoms 

otherwise.





Objective: 


 Vital Signs











Temp Pulse Resp BP Pulse Ox


 


 37.3 C   93   18   136/77 H  91 L


 


 04/04/19 07:26  04/04/19 09:24  04/04/19 09:24  04/04/19 07:26  04/04/19 09:24








 Microbiology











 04/02/19 12:30  - Final





 Sputum, Expectorated 


 


 04/02/19 16:25 Mycobacterial Smear (GHASSAN) - Final





 Thoracic Fluid - Aspirate 


 


 04/02/19 16:25 Gram Stain - Final





 Thoracic Fluid - Aspirate 








 Laboratory Results





 04/04/19 05:15 





 04/04/19 05:15 





 











 04/03/19 04/04/19 04/05/19





 05:59 05:59 05:59


 


Intake Total 500  


 


Output Total 1000  


 


Balance -500  








Medications











Generic Name Dose Route Start Last Admin





  Trade Name Freq  PRN Reason Stop Dose Admin


 


Doxycycline Hyclate 100 mg/  260 mls @ 260 mls/hr  04/03/19 10:30  04/04/19 08:

36





  Sodium Chloride  IV  05/03/19 10:29  260 mls





  Q12HRS BAL   





  Protocol   


 


Cefepime HCl 2 gm/ Sodium  100 mls @ 200 mls/hr  04/02/19 14:00  04/04/19 06:22





  Chloride  IV  05/02/19 13:59  100 mls





  Q8HRS [facility]   





  Protocol   














Discontinued Medications














Generic Name Dose Route Start Last Admin





  Trade Name Freq  PRN Reason Stop Dose Admin


 


Azithromycin  500 mg  04/01/19 09:45  04/01/19 09:44





  Zithromax  PO  04/01/19 09:46  500 mg





  ONCE ONE   


 


Azithromycin  250 mg  04/02/19 09:00  04/02/19 08:42





  Zithromax  PO  05/02/19 08:59  250 mg





  DAILY [facility]   





  Protocol   


 


Ceftriaxone Sodium/Dextrose  50 mls @ 100 mls/hr  04/01/19 09:10  04/01/19 09:22





  Rocephin 1 Gm (Premix)  IV  04/01/19 09:39  50 mls





  EDNOW ONE   





  Protocol   


 


Ceftriaxone Sodium/Dextrose  50 mls @ 100 mls/hr  04/02/19 09:00  04/02/19 09:48





  Rocephin 1 Gm (Premix)  IV  05/02/19 08:59  50 mls





  DAILY [facility]   





  Protocol   


 


Vancomycin/Sodium Chloride  250 mls @ 250 mls/hr  04/02/19 14:00  04/03/19 02:06





  Vancomycin 1 Gm (Premix)  IV  05/02/19 13:59  250 mls





  Q12H [facility]   








Microbiology





04/02/19 16:25   Thoracic Fluid - Aspirate   Mycobacterial Smear (GHASSAN) - Final


04/02/19 16:25   Thoracic Fluid - Aspirate   Gram Stain - Final


04/02/19 12:30   Sputum, Expectorated    - Final


04/01/19 08:50   Nasal, Sinus - Swab   Respiratory Panel (PCR) - Final


                              No Organism Detected By Pcr


04/02/19 16:25   Thoracic Fluid - Aspirate   Fungal Culture - Preliminary


04/02/19 16:25   Thoracic Fluid - Aspirate   Anaerobic Culture - Preliminary


04/02/19 12:30   Sputum, Expectorated   Sputum Culture - Preliminary


04/01/19 08:30   Blood   Blood Culture - Preliminary


04/01/19 08:20   Blood   Blood Culture - Preliminary





 Laboratory Tests











  04/01/19 04/02/19 04/02/19





  08:20 09:50 18:00


 


WBC  13.82 H  


 


Hgb  12.7 L  


 


Plt Count  278  


 


Absolute Neuts (auto)  11.38 H  


 


Absolute Lymphs (auto)  0.95 L  


 


Absolute Eos (auto)  0.00 L  


 


Creatinine   


 


Total Bilirubin   1.6 H 


 


AST   19 


 


ALT   27 


 


Total Protein   5.4 L 


 


Albumin   2.7 L 


 


Urine Legionella Ag    Pending


 


Ur Strep pneumoniae Ag    Negative


 


TB Blood Test (T-Spot)   














  04/03/19 04/04/19 04/04/19





  04:46 05:15 05:15


 


WBC    11.98 H


 


Hgb    9.9 L


 


Plt Count    247


 


Absolute Neuts (auto)  10.74 H  


 


Absolute Lymphs (auto)  0.82 L  


 


Absolute Eos (auto)  0.06  


 


Creatinine   


 


Total Bilirubin   


 


AST   


 


ALT   


 


Total Protein   


 


Albumin   


 


Urine Legionella Ag   


 


Ur Strep pneumoniae Ag   


 


TB Blood Test (T-Spot)   Pending 














  04/04/19





  05:15


 


WBC 


 


Hgb 


 


Plt Count 


 


Absolute Neuts (auto) 


 


Absolute Lymphs (auto) 


 


Absolute Eos (auto) 


 


Creatinine  0.6 L


 


Total Bilirubin 


 


AST 


 


ALT 


 


Total Protein 


 


Albumin 


 


Urine Legionella Ag 


 


Ur Strep pneumoniae Ag 


 


TB Blood Test (T-Spot) 














- Physical Exam


General Appearance: no apparent distress, non-toxic


EENT: No scleral icterus


Respiratory: No respiratory distress, No accessory muscle use, No wheezing


Neck: full range of motion, supple


Cardiac/Chest: No tachycardia


Extremities: No erythema


Abdomen: non-tender, soft, No distended, No guarding


Skin: No jaundice, No erythema


Neuro/Psych: alert, normal mood/affect, oriented x 3, No confused





- Time Spent With Patient


Time Spent with Patient: greater than 35 minutes (Discussed feasibility of 

tuberculosis in a person from a high incidence country, testing for tuberculosis

, isolation precautions, treatment if tuberculosis were to be positive, 

likelihood that this would be more common pathogen like Legionella or mycoplasma

)


Time Spent with Patient: Greater than 35 minutes spent on this patients care, 

greater than 50% of time spent counseling, educating, and coordinating care 

regarding the above mentioned plan.





ICD10 Worksheet


Patient Problems: 


 Problems











Problem Status Onset


 


Pneumonia Acute

## 2019-04-05 LAB — PLATELET # BLD: 305 10^3/UL (ref 150–400)

## 2019-04-05 RX ADMIN — IBUPROFEN PRN MG: 600 TABLET ORAL at 12:36

## 2019-04-05 RX ADMIN — INSULIN LISPRO SCH: 100 INJECTION, SOLUTION INTRAVENOUS; SUBCUTANEOUS at 17:39

## 2019-04-05 RX ADMIN — INSULIN GLARGINE SCH UNITS: 100 INJECTION, SOLUTION SUBCUTANEOUS at 21:48

## 2019-04-05 RX ADMIN — DOCUSATE SODIUM AND SENNOSIDES SCH TAB: 50; 8.6 TABLET ORAL at 10:04

## 2019-04-05 RX ADMIN — HYDROCODONE BITARTRATE AND ACETAMINOPHEN PRN TAB: 5; 325 TABLET ORAL at 16:35

## 2019-04-05 RX ADMIN — GUAIFENESIN SCH MG: 600 TABLET, EXTENDED RELEASE ORAL at 10:05

## 2019-04-05 RX ADMIN — FAMOTIDINE SCH MG: 20 TABLET, FILM COATED ORAL at 10:05

## 2019-04-05 RX ADMIN — DEXTROSE MONOHYDRATE SCH MLS: 5 INJECTION, SOLUTION INTRAVENOUS at 05:11

## 2019-04-05 RX ADMIN — INSULIN LISPRO SCH UNITS: 100 INJECTION, SOLUTION INTRAVENOUS; SUBCUTANEOUS at 21:48

## 2019-04-05 RX ADMIN — INSULIN LISPRO SCH: 100 INJECTION, SOLUTION INTRAVENOUS; SUBCUTANEOUS at 07:50

## 2019-04-05 RX ADMIN — GUAIFENESIN SCH MG: 600 TABLET, EXTENDED RELEASE ORAL at 21:46

## 2019-04-05 RX ADMIN — INSULIN GLARGINE SCH UNITS: 100 INJECTION, SOLUTION SUBCUTANEOUS at 10:05

## 2019-04-05 RX ADMIN — BENZONATATE PRN MG: 100 CAPSULE, LIQUID FILLED ORAL at 02:45

## 2019-04-05 RX ADMIN — ENOXAPARIN SODIUM SCH MG: 100 INJECTION SUBCUTANEOUS at 10:05

## 2019-04-05 RX ADMIN — DOCUSATE SODIUM AND SENNOSIDES SCH: 50; 8.6 TABLET ORAL at 21:48

## 2019-04-05 RX ADMIN — FAMOTIDINE SCH MG: 20 TABLET, FILM COATED ORAL at 21:46

## 2019-04-05 RX ADMIN — INSULIN LISPRO SCH: 100 INJECTION, SOLUTION INTRAVENOUS; SUBCUTANEOUS at 12:38

## 2019-04-05 RX ADMIN — IBUPROFEN PRN MG: 600 TABLET ORAL at 05:10

## 2019-04-05 RX ADMIN — ATORVASTATIN CALCIUM SCH MG: 10 TABLET, FILM COATED ORAL at 21:46

## 2019-04-05 RX ADMIN — DOXYCYCLINE SCH MLS: 100 INJECTION, POWDER, LYOPHILIZED, FOR SOLUTION INTRAVENOUS at 08:31

## 2019-04-05 RX ADMIN — BENZONATATE PRN MG: 100 CAPSULE, LIQUID FILLED ORAL at 16:36

## 2019-04-05 NOTE — HOSPPROG
Hospitalist Progress Note


Assessment/Plan: 








#CAP/left parapneumonic effusion (personally-reviewed CT and no loculations)


-thoracentesis 4/2. 


-still febrile so lab testing for TB, histoplasmosis pending


-CT chest, abd/pelvis today


-no vegetations on echo. Negative Strep Ag. Negative cytology


-5 days abx, will stop and pursue as above


-Pulmonology to evaluate


-If TB PCR negative, can take off isolation





#Sepsis: due to above





#Acute hypoxemic resp failure: improved after thora


-BNP minimally elevated. Normal LVEF, mild MR/TR





#Pleuritic CP: due to PNA/effusion. Negative trop. PRN Advil





#DM2: not eating much, hold orals. SSI, add glargine when eating more





#HTN: holding BP meds





#Hypokalemia: repleted





#Hyponatremia: mild. Encourage PO intake





#Diet: DM





#DVT ppx: Lovenox





#Disp: inpatient admission


Subjective: chilled with fever this afternoon


Objective: 


 Vital Signs











Temp Pulse Resp BP Pulse Ox


 


 37.1 C   78   18   124/74 H  95 


 


 04/05/19 11:25  04/05/19 11:25  04/05/19 11:25  04/05/19 11:25  04/05/19 11:25








 Microbiology











 04/04/19 08:50 Mycobacterium tuberculosis DNA (PCR - Final





 Sputum, Induced/Suctioned    Pcr Negative For M. Tb Complex


 


 04/02/19 16:25 Gram Stain - Final





 Thoracic Fluid - Aspirate 


 


 04/02/19 12:30  - Final





 Sputum, Expectorated Sputum Culture - Final


 


 04/04/19 09:36  - Final





 Sputum, Induced/Suctioned 








 Laboratory Results





 04/05/19 04:22 





 04/05/19 04:22 





 











 04/04/19 04/05/19 04/06/19





 05:59 05:59 05:59


 


Intake Total  1000 


 


Output Total  1700 


 


Balance  -700 








 











PT  15.7 SEC (12.0-15.0)  H  04/02/19  14:05    


 


INR  1.31  (0.83-1.16)  H  04/02/19  14:05    














- Time Spent With Patient


Time Spent with Patient: greater than 35 minutes


Time Spent with Patient: Greater than 35 minutes spent on this patients care, 

greater than 50% of time spent counseling, educating, and coordinating care 

regarding the above mentioned plan.





- Physical Exam


Constitutional: no apparent distress, other (diaphoretic)


Eyes: PERRL


Ears, Nose, Mouth, Throat: moist mucous membranes


Cardiovascular: regular rate and rhythym


Respiratory: inspiratory crackles (left base)


Gastrointestinal: normoactive bowel sounds


Genitourinary: no bladder fullness


Skin: warm, No rash


Musculoskeletal: full muscle strength


Neurologic: AAOx3


Psychiatric: interacting appropriately





ICD10 Worksheet


Patient Problems: 


 Problems











Problem Status Onset


 


Pneumonia Acute

## 2019-04-05 NOTE — PCMIDPN
Assessment/Plan: 


1. 58-year-old [place] male with poorly controlled diabetes, admitted with 

several weeks of feeling poorly with sweats, headaches, and left-sided chest 

pain associated with mild cough, occasionally productive of nonbloody phlegm:


Reviewed patient's CT scan from 4 days ago.  Parenchymal involvement is 

underwhelming; predominant finding is left pleural effusion that was tapped and 

is exudative in nature with a neutrophilic predominance.  Cytology without 

malignant cells.  He has not responded to 5 days of antibiotics for typical 

bacterial pathogens, and continues to spike fevers and feel unwell.  His main 

complaint is ongoing left-sided chest discomfort that radiates to his abdomen.  

Hypoxia and cough are minimal.  At this point in time, we must consider 

tuberculosis until proven otherwise.  This can be a difficult diagnosis to make 

with reactivation disease.  He may need a pleural biopsy moving forward to 

facilitate diagnosis with histopathology.  He has only lived in [facility], [place]

, and briefly in [place].  He does not have a hot tub, and no other unusual 

exposures.  He does not own birds or animals.  Last travel to [facility] 2016, and 

he was there for several months at a time (he travels there every 3-4 years)  

As such, endemic fungal infections in [facility] such as histoplasmosis must be 

considered as well.  Meliodosis should also be considered, but would have 

expected him to respond to cefepime and doxycycline.  Clinical presentation is 

not consistent with non infectious etiologies such as GPA, and malignancy seems 

less likely although is still plausible.  





* Will repeat CT scans of the chest, abdomen and pelvis today.  Have asked 

patient to make sure he takes a deep breath, as his CTs and chest x-rays have 

had poor inspiration


* Called microbiology lab:  TB PCR will be done this morning.  If negative, 

patient can be removed from isolation (there are excellent data showing that 

with 1-TB PCR, patients can be safely removed from isolation)


* Will order blood culture for AFB.  AFB on expectorated sputum and pleural 

fluid pending.


* Urine histoplasmosis antigen pending.  Have also added serum cryptococcal 

antigen, although this disease seems less likely


* T spot pending; will not plant PPD in the setting of prior BCG vaccine.


* HIV antigen antibody test pending


* Will review radiographic tests today and ask Pulmonary to see the patient as 

well.  Depending on radiographic findings, may pursue bronchoscopy for further 

diagnostic testing. (AFB, fungal, [facility] 

[facility] testing, Legionella culture) Also may need pleural biopsy as outlined above.


* Given clinical stability and lack of response to antibiotics after 5 days 

with ongoing high fevers, will stop antibiotics and pursue diagnostic testing 

as outlined above.  If clinically worsens, will resume antibiotics.








Over 60 min was spent with this patient today.





























04/05/19 10:52





Subjective: 


Long conversation with patient, his son, it and some contribution from patient'

s wife today as well.  He began feeling unwell perhaps 1 and half months ago, 

and it started with some sweating, and malaise.  No weight loss, but patient's 

son reports that he has had drenching night sweats.  Appetite is poor.  Soon 

thereafter, developed some left-sided chest discomfort, and a cough.  The cough 

is minimal now.  He is off oxygen, and feels that he is getting enough air to 

breathe.  No hot tub exposure.  He does not own animals or birds.  He travels 

to [facility] every 3-4 years, and days there several months at a time.  He stays in 

a city of about 4000 people 10 miles outside of [facility].  [place]

  He has had no known tuberculosis exposures that he is aware of.  He has a 

mild headache presently, but no visual disturbance, confusion, diarrhea or 

changes in his bowel movements.  No skin lesions or joint pain.  His main 

complaint is ongoing left-sided chest discomfort, that he states radiates to 

his lower abdomen.





Objective: 


Cefepime 2 g IV q.8 hours day 3


Doxycycline 100 mg IV q.12 hours day 2 (antibiotics day 5)


T-max 38.9 degrees Vital Signs











Temp Pulse Resp BP Pulse Ox


 


 36.7 C   78   16   139/83 H  92 


 


 04/05/19 07:36  04/05/19 07:36  04/05/19 07:36  04/05/19 07:36  04/05/19 07:36








 Microbiology











 04/04/19 09:36  - Final





 Sputum, Induced/Suctioned 


 


 04/02/19 16:25 Gram Stain - Final





 Thoracic Fluid - Aspirate 


 


 04/02/19 12:30  - Final





 Sputum, Expectorated 








 Laboratory Results





 04/05/19 04:22 





 04/05/19 04:22 





 











 04/04/19 04/05/19 04/06/19





 05:59 05:59 05:59


 


Intake Total  1000 


 


Output Total  1700 


 


Balance  -700 








 











ESR  108 MM/HR (0-20)  H  04/04/19  05:13    


 


C-Reactive Protein  235.9 mg/L (<10.0)  H  04/04/19  05:13    








Blood cultures April 1st no growth


April 2nd sputum with oral nisha


April 2nd pleural fluid 4+ PMNs, culture pending, AFB stain negative, culture 

pending, fungal culture pending


April 4th sputum AFB pending, culture pending HIV antibody/antigen pending, 

urine histoplasmosis antigen pending, Legionella urine antigen pending.  CRP 

markedly positive at 235


TB PCR on sputum pending





- Physical Exam


General Appearance: alert, no apparent distress, other (Did not cough once 

during my exam)


EENT: normal ENT inspection


Respiratory: crackles, other (Crackles left lung base, up to mid lung field.  

No rub.)


Cardiac/Chest: regular rate, rhythm, No systolic murmur, No friction rub


Extremities: No pedal edema


Abdomen: non-tender, soft


Skin: No rash, No embolic lesions


Neuro/Psych: oriented x 3





ICD10 Worksheet


Patient Problems: 


 Problems











Problem Status Onset


 


Pneumonia Acute

## 2019-04-05 NOTE — ASMTCMCOM
CM Note

 

CM Note                       

Notes:

Spoke w/pt's son regarding FMLA paperwork. MD spoke with son who will keep paperwork for now until 

a clearer picture of pt's needs are known. 



DC date still uncertain, PT had previously cleared pt for home, CM w/f but pt will likely be 

independent.



DC Plan: Independent w/assist from son

 

Date Signed:  04/05/2019 02:45 PM

Electronically Signed By:Vickie Jessica RN

## 2019-04-06 RX ADMIN — LISINOPRIL AND HYDROCHLOROTHIAZIDE SCH EA: 10; 12.5 TABLET ORAL at 17:37

## 2019-04-06 RX ADMIN — IBUPROFEN PRN MG: 600 TABLET ORAL at 11:35

## 2019-04-06 RX ADMIN — FAMOTIDINE SCH MG: 20 TABLET, FILM COATED ORAL at 09:22

## 2019-04-06 RX ADMIN — ENOXAPARIN SODIUM SCH: 100 INJECTION SUBCUTANEOUS at 09:10

## 2019-04-06 RX ADMIN — INSULIN GLARGINE SCH: 100 INJECTION, SOLUTION SUBCUTANEOUS at 11:57

## 2019-04-06 RX ADMIN — HYDROCODONE BITARTRATE AND ACETAMINOPHEN PRN TAB: 5; 325 TABLET ORAL at 09:21

## 2019-04-06 RX ADMIN — INSULIN LISPRO SCH UNITS: 100 INJECTION, SOLUTION INTRAVENOUS; SUBCUTANEOUS at 17:43

## 2019-04-06 RX ADMIN — DOCUSATE SODIUM AND SENNOSIDES SCH: 50; 8.6 TABLET ORAL at 09:10

## 2019-04-06 RX ADMIN — INSULIN LISPRO SCH UNITS: 100 INJECTION, SOLUTION INTRAVENOUS; SUBCUTANEOUS at 11:36

## 2019-04-06 RX ADMIN — BENZONATATE PRN MG: 100 CAPSULE, LIQUID FILLED ORAL at 19:36

## 2019-04-06 RX ADMIN — ACETAMINOPHEN PRN MG: 325 TABLET ORAL at 01:33

## 2019-04-06 RX ADMIN — ENOXAPARIN SODIUM SCH MG: 100 INJECTION SUBCUTANEOUS at 11:34

## 2019-04-06 RX ADMIN — INSULIN LISPRO SCH: 100 INJECTION, SOLUTION INTRAVENOUS; SUBCUTANEOUS at 09:24

## 2019-04-06 RX ADMIN — HYDROCODONE BITARTRATE AND ACETAMINOPHEN PRN TAB: 5; 325 TABLET ORAL at 16:10

## 2019-04-06 RX ADMIN — INSULIN GLARGINE SCH UNITS: 100 INJECTION, SOLUTION SUBCUTANEOUS at 11:36

## 2019-04-06 RX ADMIN — INSULIN LISPRO SCH UNITS: 100 INJECTION, SOLUTION INTRAVENOUS; SUBCUTANEOUS at 21:27

## 2019-04-06 RX ADMIN — IBUPROFEN PRN MG: 600 TABLET ORAL at 19:36

## 2019-04-06 RX ADMIN — BENZONATATE PRN MG: 100 CAPSULE, LIQUID FILLED ORAL at 01:33

## 2019-04-06 RX ADMIN — INSULIN GLARGINE SCH UNITS: 100 INJECTION, SOLUTION SUBCUTANEOUS at 21:27

## 2019-04-06 RX ADMIN — GUAIFENESIN SCH MG: 600 TABLET, EXTENDED RELEASE ORAL at 09:21

## 2019-04-06 RX ADMIN — GUAIFENESIN SCH MG: 600 TABLET, EXTENDED RELEASE ORAL at 20:09

## 2019-04-06 RX ADMIN — INSULIN GLARGINE SCH: 100 INJECTION, SOLUTION SUBCUTANEOUS at 09:25

## 2019-04-06 RX ADMIN — ATORVASTATIN CALCIUM SCH MG: 10 TABLET, FILM COATED ORAL at 20:09

## 2019-04-06 NOTE — ASMTCMCOM
CM Note

 

CM Note                       

Notes:

Pt has a VATS procedure and L lung wedge bx planned for tomorrow. CM will continue to follow for 

any d/c needs.



D/C plan: TBD

 

Date Signed:  04/06/2019 01:59 PM

Electronically Signed By:ULISES Healy

## 2019-04-06 NOTE — HOSPPROG
Hospitalist Progress Note


Assessment/Plan: 





*  Empyema - previous thoracentesis with pH 7.0


   -reaccumulating fluid, persistent fever, now loculated


   -surgery consulted for VATS


   -cultures and pleural biopsy arranged by ID


   -keep isolation - rule out TB


*  Sepsis


   -still with persistent fever


   -now watching off antibiotics per ID


*  Acute respiratory failure


   -previous resp rate 45 with 10L O2 requirement


   -improved


*  DM II - uncontrolled - last HgA1c 13


   -Lantus


*  HTN


   -restart home meds


*  


   -suggests more prolonged disease process than just parapneumonic effusion (

ie TB)














Subjective: no new complaints.


Objective: 


 Vital Signs











Temp Pulse Resp BP Pulse Ox


 


 36.7 C   69   24 H  147/82 H  96 


 


 04/06/19 15:04  04/06/19 15:04  04/06/19 15:04  04/06/19 15:04  04/06/19 15:04








 Microbiology











 04/02/19 16:25 Gram Stain - Final





 Thoracic Fluid - Aspirate 


 


 04/04/19 09:36  - Final





 Sputum, Induced/Suctioned 


 


 04/05/19 13:30 Mycobacterial Smear (GHASSAN) - Final





 Blood 


 


 04/04/19 08:50 Mycobacterial Smear (GHASSAN) - Final





 Sputum, Induced/Suctioned Mycobacterium tuberculosis DNA (PCR - Final





    Pcr Negative For M. Tb Complex


 


 04/02/19 12:30  - Final





 Sputum, Expectorated Sputum Culture - Final








 Laboratory Results





 04/05/19 04:22 





 04/05/19 04:22 





 











 04/05/19 04/06/19 04/07/19





 05:59 05:59 05:59


 


Intake Total 1000  


 


Output Total 1700  


 


Balance -700  








 











PT  15.7 SEC (12.0-15.0)  H  04/02/19  14:05    


 


INR  1.31  (0.83-1.16)  H  04/02/19  14:05    








d/w DR. wilder regarding OR plan


CT chest/abd/pelvis - pleural effusion is now loculated





- Physical Exam


Constitutional: no apparent distress, appears nourished, not in pain


Cardiovascular: regular rate and rhythym, no murmur, rub, or gallop


Respiratory: no respiratory distress, no rales or rhonchi, clear to auscultation


Gastrointestinal: normoactive bowel sounds, soft, non-tender abdomen, no 

palpable masses


Skin: no rashes or abrasions, no fluctuance, no induration


Neurologic: AAOx3, sensation intact bilaterally


Psychiatric: interacting appropriately, not anxious, not encephalopathic, 

thought process linear





ICD10 Worksheet


Patient Problems: 


 Problems











Problem Status Onset


 


Pneumonia Acute

## 2019-04-06 NOTE — GCON
[f rep st]



                                                                    CONSULTATION





CHIEF COMPLAINT:  Chest pain, fever.



HISTORY OF PRESENT ILLNESS:  This is a 58-year-old male with a history of type 2 diabetes, hypertensi
on, and hyperlipidemia who was brought in by family for a 2-week history of chest pain and fevers.  H
e reports that for 1-1/2 months, he has had night sweats, and in the last few weeks he has developed 
cough productive of brown sputum, fevers, diaphoresis and has felt weak.  Of note, the [place] is [facility] [place]  Surgery consulted for VATS procedure to drain loculated left pl
eural effusion in addition to small wedge biopsy of lung for diagnostic purposes.



PAST MEDICAL HISTORY:  Hypertension, hyperlipidemia, type 2 diabetes.



PAST SURGICAL HISTORY:  Hernia repair at [facility] 2009 without complications.



FAMILY HISTORY:  Negative for heart disease, heart attack, stroke, seizure, bleeding or clotting diso
rders.  Otherwise noncontributory.



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  He is
 here with his wife and niece today.  He seems to have good understanding of English, and he was prov
ided interpreting if needed although interpretation services were offered.



REVIEW OF SYSTEMS:  Endorses headache 3 or more times per week which improves with ibuprofen, states 
this has been ongoing for several years, otherwise review of systems was negative other than stated i
n HPI.



ALLERGIES:  No known drug allergies.



PHYSICAL EXAM:  GENERAL:  This is a pleasant, well-nourished man, who appears slightly diaphoretic bu
t in no acute distress on nasal cannula.  HEENT:  Normocephalic.  No gross hearing deficits.  Mucous 
membranes moist.  Eyes, no scleral icterus, no injection.  Pupils are equal in size.  LUNGS:  Little 
to no air movement in the left lower lung.  Left lower lobe mild crackles throughout left parenchyma.
  Right lobe clear to auscultation.  No increased work of breathing.  CARDIAC:  Regular rate and rhyt
hm.  Normal S1 and S2.  No peripheral edema could be appreciated.  ABDOMEN:  Bowel sounds present.  S
oft, nondistended, nontender.  No hepatosplenomegaly.  SKIN:  Warm, damp.  PSYCH:  Mood and affect no
rmal.



LABS:  White count elevated at 14.3 on admission, is 12.3 today after antibiotics.  H and H low at 10
.7 and 31.9 respectively.  HIV testing was negative.  Other infectious etiologies pending.



IMAGING:  Chest CT on 04/05/2019 showed:

1.  Increase in loculated effusion in the left base posteriorly with adjacent compressive atelectatic
 change.  

2.  Small left pleural effusion has developed with persistent basilar atelectasis as well as a small 
amount of fluid in the left major fissure superiorly. 

3.  Stable small pericardial effusion.  

4.  Development of patchy ground-glass attenuation pneumonitis inferior aspect, right upper lobe ante
rolaterally.



ASSESSMENT/PLAN:  This is a pleasant 58-year-old male with persistent fevers, white blood count eleva
tion, and recurrent left-sided pleural effusion despite 5 days of antibiotics.  Given history, sympto
ms and overall clinical picture, there is high suspicion for loculated TB pleural effusion.  Plan to 
perform left VATS procedure and small left lung wedge biopsy.  Will send pleural biopsy for AFB stain
 and culture and histopathology; cell count, protein, glucose, pH and orders on that fluid per ID. 



The patient was informed of the risks of the procedure including, but not limited to infection, bleed
ing, stroke, heart attack, death, and need for ICD placement.  He was informed that should he proceed
 with the procedure he would be done under general anesthesia, which has inherent risks in of itself 
including, but not limited to sudden cardiac death.  He would need a chest tube following the procedu
re that would likely be in for several days up to and possibly including after discharge home.  The p
atient was understanding of these risks and elected to move forward with the procedure, which is sche
duled for 8:00 a.m. 04/07/2019.





Job #:  [id number]

## 2019-04-06 NOTE — PCMIDPN
Assessment/Plan: 


1. 58-year-old [place] male with poorly controlled diabetes, admitted with 

several weeks of feeling poorly with sweats, headaches, and left-sided 

exudative pleural effusion with minimal lung involvement:


Chest CT reviewed today with Dr. Randall.  The patient has reaccumulated the 

left pleural effusion, which is now looking more organized/empyema-like.  Lung 

parenchymal involvement is minimal, with some patchy involvement of the right 

upper lobe.  Although a pleural effusion due to tuberculosis is typically 

lymphocytic in nature, approximately 20-40% of the time it can be neutrophilic.

  This patient needs a VATS for diagnosis, including pleural biopsy and culture 

as I will outline below.  This was all explained to the patient in detail 

today.  Differential diagnosis includes tuberculosis, bacterial empyema, fungal 

infection such as histoplasmosis, or malignancy.  An autoimmune or vasculitic 

etiology seems highly unlikely.





* Spoke with Dr. Stone.  Asked me to make patient NPO.  He is likely not able to 

do the case until later this afternoon, this evening, or tomorrow.


* Orders placed on VATS for pleural biopsy AFB stain and culture and 

histopathology.


* Orders placed on pleural fluid for AFB stain culture, fungal stain and culture

, Gram stain and anaerobic culture, and cytology.  Have also asked them to send 

pleural fluid for TB PCR which can be done in house, and spoke with 

microbiology lab about this.


* Have also ordered cell count, protein, glucose, pH, etc on VATS fluid


* Spoke with Dr. Fercho Tompkins, the on-call pathologist so that he is aware 

we are looking for TB on pleural biopsy as well as fungal etiology, etc.


* He will remain off antibiotics for now given clinical stability and need for 

diagnosis.





Over an hour spent with this patient today, coordinating care, speaking with 

family members, etc.
































Subjective: 


Reviewed CT scans.  CT scan of the abdomen and pelvis is completely normal.  CT 

of the chest shows robust reaccumulation of pleural fluid on the left side.  He 

also has some patchy parenchymal involvement of the right upper lobe that is 

minimal.  Some calcified granulomas also seen.  Patient continues to spike 

fevers up to 39.  Minimal dry cough.  He has pain when he takes a deep breath 

on the left side.  Had 5 episodes of diarrhea yesterday that is being 

attributed to oral contrast.  No abdominal pain.  Long conversation with patient

, son, and patient's wife today regarding the plan as outlined below.





Objective: 


No antibiotics


T-max 39.1 degrees Vital Signs











Temp Pulse Resp BP Pulse Ox


 


 37.6 C   87   20   130/88 H  92 


 


 04/06/19 07:48  04/06/19 07:48  04/06/19 07:48  04/06/19 07:48  04/06/19 07:48








 Microbiology











 04/04/19 09:36  - Final





 Sputum, Induced/Suctioned 


 


 04/05/19 13:30 Mycobacterial Smear (GHASSAN) - Final





 Blood 


 


 04/04/19 08:50 Mycobacterial Smear (GHASSAN) - Final





 Sputum, Induced/Suctioned Mycobacterium tuberculosis DNA (PCR - Final





    Pcr Negative For M. Tb Complex


 


 04/02/19 16:25 Gram Stain - Final





 Thoracic Fluid - Aspirate 


 


 04/02/19 12:30  - Final





 Sputum, Expectorated Sputum Culture - Final








 Laboratory Results





 04/05/19 04:22 





 04/05/19 04:22 





 











 04/05/19 04/06/19 04/07/19





 05:59 05:59 05:59


 


Intake Total 1000  


 


Output Total 1700  


 


Balance -700  








 











ESR  108 MM/HR (0-20)  H  04/04/19  05:13    


 


C-Reactive Protein  235.9 mg/L (<10.0)  H  04/04/19  05:13    








No new microbiology compared with yesterday





- Physical Exam


General Appearance: alert, no apparent distress


EENT: pharynx normal, other (Dentition in fair repair.), No thrush


Respiratory: crackles, other (Diminished breath sounds and crackles left base)


Cardiac/Chest: regular rate, rhythm


Abdomen: non-tender, soft


Skin: No rash, No embolic lesions





ICD10 Worksheet


Patient Problems: 


 Problems











Problem Status Onset


 


Pneumonia Acute

## 2019-04-07 PROCEDURE — 0BNL3ZZ RELEASE LEFT LUNG, PERCUTANEOUS APPROACH: ICD-10-PCS | Performed by: SURGERY

## 2019-04-07 PROCEDURE — 0BBJ4ZX EXCISION OF LEFT LOWER LUNG LOBE, PERCUTANEOUS ENDOSCOPIC APPROACH, DIAGNOSTIC: ICD-10-PCS | Performed by: SURGERY

## 2019-04-07 RX ADMIN — INSULIN LISPRO SCH UNITS: 100 INJECTION, SOLUTION INTRAVENOUS; SUBCUTANEOUS at 11:51

## 2019-04-07 RX ADMIN — GUAIFENESIN SCH: 600 TABLET, EXTENDED RELEASE ORAL at 11:51

## 2019-04-07 RX ADMIN — HYDROCODONE BITARTRATE AND ACETAMINOPHEN PRN TAB: 5; 325 TABLET ORAL at 17:23

## 2019-04-07 RX ADMIN — INSULIN GLARGINE SCH UNITS: 100 INJECTION, SOLUTION SUBCUTANEOUS at 20:52

## 2019-04-07 RX ADMIN — GUAIFENESIN SCH: 600 TABLET, EXTENDED RELEASE ORAL at 07:46

## 2019-04-07 RX ADMIN — LISINOPRIL AND HYDROCHLOROTHIAZIDE SCH EA: 10; 12.5 TABLET ORAL at 11:45

## 2019-04-07 RX ADMIN — INSULIN GLARGINE SCH: 100 INJECTION, SOLUTION SUBCUTANEOUS at 07:46

## 2019-04-07 RX ADMIN — HYDROCODONE BITARTRATE AND ACETAMINOPHEN PRN TAB: 5; 325 TABLET ORAL at 11:44

## 2019-04-07 RX ADMIN — HYDROCODONE BITARTRATE AND ACETAMINOPHEN PRN TAB: 5; 325 TABLET ORAL at 13:15

## 2019-04-07 RX ADMIN — INSULIN LISPRO SCH: 100 INJECTION, SOLUTION INTRAVENOUS; SUBCUTANEOUS at 07:46

## 2019-04-07 RX ADMIN — INSULIN LISPRO SCH UNITS: 100 INJECTION, SOLUTION INTRAVENOUS; SUBCUTANEOUS at 19:00

## 2019-04-07 RX ADMIN — ACETAMINOPHEN PRN MG: 325 TABLET ORAL at 12:15

## 2019-04-07 RX ADMIN — GUAIFENESIN SCH MG: 600 TABLET, EXTENDED RELEASE ORAL at 20:52

## 2019-04-07 RX ADMIN — BENZONATATE PRN MG: 100 CAPSULE, LIQUID FILLED ORAL at 02:21

## 2019-04-07 RX ADMIN — MENTHOL SCH PATCH: 1 SPRAY TOPICAL at 11:45

## 2019-04-07 RX ADMIN — HYDROCODONE BITARTRATE AND ACETAMINOPHEN PRN TAB: 5; 325 TABLET ORAL at 06:31

## 2019-04-07 RX ADMIN — INSULIN LISPRO SCH UNITS: 100 INJECTION, SOLUTION INTRAVENOUS; SUBCUTANEOUS at 20:53

## 2019-04-07 RX ADMIN — ATORVASTATIN CALCIUM SCH MG: 10 TABLET, FILM COATED ORAL at 20:52

## 2019-04-07 RX ADMIN — IBUPROFEN PRN MG: 600 TABLET ORAL at 20:51

## 2019-04-07 RX ADMIN — INSULIN GLARGINE SCH UNITS: 100 INJECTION, SOLUTION SUBCUTANEOUS at 11:45

## 2019-04-07 RX ADMIN — INSULIN GLARGINE SCH: 100 INJECTION, SOLUTION SUBCUTANEOUS at 11:52

## 2019-04-07 NOTE — PDANEPAE
ANE History of Present Illness





VATS





ANE Past Medical History





- Cardiovascular History


Hx Hypertension: Yes


Hx Arrhythmias: Yes





- Pulmonary History


Hx COPD: Yes


Hx Oxygen in Use at Home: No


Hx Sleep Apnea: No


Sleep Apnea Screening Result - Last Documented: Positive





- Endocrine History


Hx Diabetes: Yes





- Chronic Pain History


Chronic Pain: No





ANE Review of Systems


Review of Systems: 








ANE Patient History





- Allergies


Allergies/Adverse Reactions: 








No Known Allergies Allergy (Verified 04/01/19 09:52)


 








- Home Medications


Home Medications: 








Insulin Glargine [Lantus] 10 unit SC BID 04/01/19 [Last Taken Unknown]


Lisinopril/Hydrochlorothiazide [Zestoretic 20-25 mg Tablet] 1 each PO DAILY 04/ 01/19 [Last Taken Unknown]


Simvastatin [Zocor] 20 mg PO HS 04/01/19 [Last Taken Unknown]


glipiZIDE [Glucotrol] 5 mg PO BID 04/01/19 [Last Taken Unknown]


metFORMIN HCL [Glucophage 1000 mg] 1,000 mg PO BIDMEAL 04/01/19 [Last Taken 

Unknown]








- NPO status


NPO Since - Liquids (Date): 04/07/19


NPO Since - Liquids (Time): 00:00


NPO Since - Solids (Date): 04/07/19


NPO Since - Solids (Time): 00:00





- Smoking Hx


Smoking Status: Never smoked





- Alcohol Use


Alcohol Use: Sober





ANE Labs/Vital Signs





- Labs


Result Diagrams: 


 04/05/19 04:22





 04/05/19 04:22





- Vital Signs


Blood Pressure: 153/91


Heart Rate: 86


Respiratory Rate: 18


O2 Sat (%): 93


Height: 167.64 cm


Weight: 63.957 kg





ANE Physical Exam





- Airway


Neck exam: FROM


Mallampati Score: Class 2


Mouth exam: normal dental/mouth exam





- Pulmonary


Pulmonary: clear to auscultation





- Cardiovascular


Cardiovascular: regular rate and rhythym





- ASA Status


ASA Status: I





ANE Anesthesia Plan


Anesthesia Plan: general endotracheal anesthesia


Specialized Airway: double lumen tube

## 2019-04-07 NOTE — POSTOPPROG
Post Op Note


Date of Operation: 04/07/19


Surgeon: Izzy Cantrell


Anesthesiologist: nisha


Anesthesia: GET(General Endotracheal)


Pre-op Diagnosis: L empyema


Post-op Diagnosis: same


Indication: 57 yo with empyema


Procedure: L VATS with decortication and wedge biopsy


Findings: multiple adhesions and loculations, clear fluid.  Stained lung


Inf/Abcess present in the surg proc area at time of surgery?: Yes


Depth: Organ Space


EBL: Minimal


Specimen(s): 





micro and path per ID

## 2019-04-07 NOTE — GOP
[f 
rep st]



                                                                OPERATIVE REPORT





DATE OF OPERATION:  04/07/2019



SURGEON:  Izzy Cantrell MD



ANESTHESIA:  General.



ANESTHESIOLOGIST:  Pablo Maza DO



PREOPERATIVE DIAGNOSIS:  Left empyema.



POSTOPERATIVE DIAGNOSIS:  Left empyema.



PROCEDURE PERFORMED:  Left video-assisted thoracoscopic surgery with 
decortication and wedge biopsy.



FINDINGS:  Multiple adhesions, loculations with serosanguineous fluid.  His 
lung was stained



SPECIMENS:  Multiple specimens for microbiology and pathology.



ESTIMATED BLOOD LOSS:  5 cc.



INDICATIONS:  The patient is a 58-year-old who presented with chest pain and 
imaging was suggestive of pneumonia and possible empyema.  As the fluid 
collection was loculated, VATS was indicated.  This will also assist with 
diagnosis.



DESCRIPTION OF PROCEDURE:  The patient was brought into the operating room, 
placed supine on the table, and general anesthesia was administered.  He was 
placed in the lateral decubitus position, with the left side up.  All bony 
prominences padded.  He was prepped and draped in the usual sterile fashion.  
Single lung ventilation initiated.  I infiltrated all sites with 0.5% Marcaine 
prior to making incisions.  I made an incision at approximately the 5th rib 
space in the anterior axillary line.  I placed the 1st trocar and immediately 
encountered lots of adhesions.  I gently swept some of these free with the 
camera and then I could identify another place where I could place a second 
trocar at the 8th rib space in the anterior axillary line.  Again, I performed 
some adhesiolysis before I could place a 3rd posterior trocar.  I continued 
gentle dissection with a Kittner and the suction .  I suctioned the 
fluid and sent this for cell studies, microbiology, as well as cytology.  I 
then was able to dissect some pleural rind and also submitted this to 
microbiology.  Finally, I was able to dissect free a small portion of the left 
lower portion of his lung.  I elevated this and used an Endo JUANIS 45 blue load 
to obtain a wedge biopsy.  Hemostasis achieved at the staple line. Specimen 
divided for micro and path.  I tested for an air leak and none was noted.  I 
then placed a 28 chest tube in the trocar site and again watched the lung re-
expand.  This was sutured into place with 0 Vicryl.  The fascia at the larger 
incision was closed with 0 Vicryl, skin closed with 4-0 Monocryl.  Mastisol, 
Steri-Strips, and sterile dressing applied.  He was placed back into the supine 
position, extubated, transferred to PACU in stable condition.





Job #:  [id number]

MTDD

## 2019-04-07 NOTE — SOAPPROG
SOAP Progress Note


Assessment/Plan: 


Assessment:








POD # 0 s.p L VATS decortication and wedge biopsy for diagnosis


Cultures and path pending


No Pneumo on CXR


Will put to water seal


Pain controlled


Ambulate and IS

















Plan:





04/07/19 15:39





Objective: 





 Vital Signs











Temp Pulse Resp BP Pulse Ox


 


 36.6 C   79   24 H  133/77 H  95 


 


 04/07/19 13:54  04/07/19 13:54  04/07/19 13:54  04/07/19 13:54  04/07/19 13:54








 Microbiology











 04/07/19 09:02 Gram Stain - Final





 Other - Tissue 


 


 04/07/19 09:02 Gram Stain - Final





 Lung - Tissue 


 


 04/07/19 09:06 Gram Stain - Final





 Pleural Fluid - Aspirate 


 


 04/04/19 09:36  - Final





 Sputum, Induced/Suctioned Sputum Culture - Final


 


 04/02/19 16:25 Gram Stain - Final





 Thoracic Fluid - Aspirate 








 Laboratory Results





 04/05/19 04:22 





 04/05/19 04:22 





 











 04/06/19 04/07/19 04/08/19





 05:59 05:59 05:59


 


Intake Total   1100


 


Output Total   95


 


Balance   1005








 











PT  15.7 SEC (12.0-15.0)  H  04/02/19  14:05    


 


INR  1.31  (0.83-1.16)  H  04/02/19  14:05    














ICD10 Worksheet


Patient Problems: 


 Problems











Problem Status Onset


 


Pneumonia Acute

## 2019-04-07 NOTE — PCMIDPN
Assessment/Plan: 


1. 58-year-old [place] male with poorly controlled diabetes, admitted with 

several weeks of feeling poorly with sweats, headaches, and left-sided 

exudative pleural effusion with minimal lung involvement status post VATS:


Talked to microbiology lab ([facility]) to make sure that all the appropriate tests 

have been sent, and pleural rind, fluid, as well as lung tissue is sent to 

pathology.  For now, given the fact that this still could be bacterial will 

restart antibiotics in the form of ceftriaxone 1 g IV daily and metronidazole 

500 mg IV q.8 hours pending further microbiologic data.  Please see my note 

from yesterday for additional information.  Explained this all in detail to the 

patient and his family, who expressed understanding.  Sincerely appreciate 

's assistance.  As outlined yesterday, differential diagnosis includes 

tuberculous pleural effusion, fungal etiology, malignancy, or bacteria, such as 

a fastidious anaerobe.  Does not have risk factors for a zoonosis, and would 

not expect and atypical pathogen to present this way, such as Legionella.  

Clinical presentation is not consistent with a small-vessel vasculitis, such as 

GPA.
































Subjective: 


Status post VATS.  Multiple studies are pending.  Pleural fluid just starting 

to be analyzed.  Minimal cough.  Pain when he takes a deep breath.





Objective: 


No antibiotics (previously received 5 days of antibiotics with vancomycin, then 

ceftriaxone and azithromycin, then cefepime and doxycycline) Vital Signs


T-max 377








Temp Pulse Resp BP Pulse Ox


 


 37.1 C   92   15   137/77 H  90 L


 


 04/07/19 10:47  04/07/19 10:47  04/07/19 10:47  04/07/19 10:47  04/07/19 10:47








 Microbiology











 04/07/19 09:02 Gram Stain - Final





 Lung - Tissue 


 


 04/07/19 09:06 Gram Stain - Final





 Pleural Fluid - Aspirate 


 


 04/04/19 09:36  - Final





 Sputum, Induced/Suctioned Sputum Culture - Final


 


 04/02/19 16:25 Gram Stain - Final





 Thoracic Fluid - Aspirate 








 Laboratory Results





 04/05/19 04:22 





 04/05/19 04:22 





 











 04/06/19 04/07/19 04/08/19





 05:59 05:59 05:59


 


Intake Total   1100


 


Output Total   95


 


Balance   1005








 











ESR  108 MM/HR (0-20)  H  04/04/19  05:13    


 


C-Reactive Protein  235.9 mg/L (<10.0)  H  04/04/19  05:13    








No new microbiologic data at this point in time.





- Physical Exam


General Appearance: alert, no apparent distress


EENT: pharynx normal, No thrush


Respiratory: other (Chest tube left chest)


Cardiac/Chest: systolic murmur





ICD10 Worksheet


Patient Problems: 


 Problems











Problem Status Onset


 


Pneumonia Acute

## 2019-04-07 NOTE — HOSPPROG
Hospitalist Progress Note


Assessment/Plan: 





*  Empyema - previous thoracentesis with pH 7.0


   -reaccumulating fluid, persistent fever, now loculated


   -surgery consulted - now s/p VATS


   -cultures and pleural biopsy arranged by ID


   -TB still in differential but off isolation due to sputum negative


*  Sepsis


   -still with persistent fever


   -back on antibiotics - Ceftriaxone, Flagyl


*  Acute respiratory failure


   -previous resp rate 45 with 10L O2 requirement


   -improved


*  DM II - uncontrolled - last HgA1c 13


   -Lantus


*  HTN


   -restart home meds


*  


   -suggests more prolonged disease process than just parapneumonic effusion (

ie TB)














Subjective: no new complaints.


Objective: 


 Vital Signs











Temp Pulse Resp BP Pulse Ox


 


 36.6 C   79   24 H  133/77 H  95 


 


 04/07/19 13:54  04/07/19 13:54  04/07/19 13:54  04/07/19 13:54  04/07/19 13:54








 Microbiology











 04/07/19 09:02 Gram Stain - Final





 Other - Tissue 


 


 04/07/19 09:02 Gram Stain - Final





 Lung - Tissue 


 


 04/07/19 09:06 Gram Stain - Final





 Pleural Fluid - Aspirate 


 


 04/04/19 09:36  - Final





 Sputum, Induced/Suctioned Sputum Culture - Final








 Laboratory Results





 04/05/19 04:22 





 04/05/19 04:22 





 











 04/06/19 04/07/19 04/08/19





 05:59 05:59 05:59


 


Intake Total   1100


 


Output Total   95


 


Balance   1005








 











PT  15.7 SEC (12.0-15.0)  H  04/02/19  14:05    


 


INR  1.31  (0.83-1.16)  H  04/02/19  14:05    














- Physical Exam


Constitutional: no apparent distress, appears nourished, not in pain


Cardiovascular: regular rate and rhythym, no murmur, rub, or gallop


Respiratory: no respiratory distress, no rales or rhonchi, clear to auscultation


Gastrointestinal: normoactive bowel sounds, soft, non-tender abdomen, no 

palpable masses


Skin: no rashes or abrasions, no fluctuance, no induration


Neurologic: AAOx3, sensation intact bilaterally


Psychiatric: interacting appropriately, not anxious, not encephalopathic, 

thought process linear





ICD10 Worksheet


Patient Problems: 


 Problems











Problem Status Onset


 


Pneumonia Acute

## 2019-04-08 LAB — PLATELET # BLD: 433 10^3/UL (ref 150–400)

## 2019-04-08 RX ADMIN — INSULIN GLARGINE SCH UNITS: 100 INJECTION, SOLUTION SUBCUTANEOUS at 21:12

## 2019-04-08 RX ADMIN — MENTHOL SCH PATCH: 1 SPRAY TOPICAL at 09:42

## 2019-04-08 RX ADMIN — BENZONATATE PRN MG: 100 CAPSULE, LIQUID FILLED ORAL at 07:29

## 2019-04-08 RX ADMIN — INSULIN LISPRO SCH UNITS: 100 INJECTION, SOLUTION INTRAVENOUS; SUBCUTANEOUS at 18:05

## 2019-04-08 RX ADMIN — INSULIN LISPRO SCH: 100 INJECTION, SOLUTION INTRAVENOUS; SUBCUTANEOUS at 23:17

## 2019-04-08 RX ADMIN — INSULIN LISPRO SCH: 100 INJECTION, SOLUTION INTRAVENOUS; SUBCUTANEOUS at 07:37

## 2019-04-08 RX ADMIN — GUAIFENESIN SCH MG: 600 TABLET, EXTENDED RELEASE ORAL at 21:12

## 2019-04-08 RX ADMIN — IBUPROFEN PRN MG: 600 TABLET ORAL at 07:29

## 2019-04-08 RX ADMIN — INSULIN GLARGINE SCH UNITS: 100 INJECTION, SOLUTION SUBCUTANEOUS at 09:42

## 2019-04-08 RX ADMIN — ACETAMINOPHEN PRN MG: 325 TABLET ORAL at 22:58

## 2019-04-08 RX ADMIN — BENZONATATE PRN MG: 100 CAPSULE, LIQUID FILLED ORAL at 17:25

## 2019-04-08 RX ADMIN — HYDROCODONE BITARTRATE AND ACETAMINOPHEN PRN TAB: 5; 325 TABLET ORAL at 04:15

## 2019-04-08 RX ADMIN — ATORVASTATIN CALCIUM SCH MG: 10 TABLET, FILM COATED ORAL at 21:12

## 2019-04-08 RX ADMIN — INSULIN LISPRO SCH UNITS: 100 INJECTION, SOLUTION INTRAVENOUS; SUBCUTANEOUS at 12:39

## 2019-04-08 RX ADMIN — LISINOPRIL AND HYDROCHLOROTHIAZIDE SCH EA: 10; 12.5 TABLET ORAL at 09:42

## 2019-04-08 RX ADMIN — GUAIFENESIN SCH MG: 600 TABLET, EXTENDED RELEASE ORAL at 09:44

## 2019-04-08 NOTE — ASMTCMCOM
CM Note

 

CM Note                       

Notes:

Patient has been cleared by PT for home. D/C plan is independent. CM available if d/c needs arise.

 

Date Signed:  04/08/2019 03:56 PM

Electronically Signed By:Luz Bruno LCSW

## 2019-04-08 NOTE — SOAPPROG
SOAP Progress Note


Assessment/Plan: 


Assessment/Plan: 57yo M POD#1 s/p L VATS decortication and wedge biopsy for 

diagnosis


Cultures and path pending


Chest tube to water seal - low output. Will likely pull chest tube this 

afternoon. CXR 4 hours post-pull


Pain controlled


Ambulation and IS


Appreciate hospitalists and ID





S: feeling well this am. Pain is controlled. Pain in his L chest wall with deep 

breathing and coughing. Ambulating well


O: standing, walking around room, no acute distress


CTAB, no increased WOB


RRR


L chest tube dressing intact


Serosanguinous fluid in pleur-evac. No air leak


Objective: 





 Vital Signs











Temp Pulse Resp BP Pulse Ox


 


 37.0 C   80   20   110/76   96 


 


 04/08/19 07:26  04/08/19 07:26  04/08/19 07:26  04/08/19 07:26  04/08/19 07:26








 Microbiology











 04/07/19 09:06 Mycobacterial Smear ([facility]) - Final





 Pleural Fluid - Aspirate 


 


 04/07/19 09:02 Mycobacterial Smear ([facility]) - Final





 Lung - Tissue 


 


 04/07/19 09:02 Mycobacterial Smear ([facility]) - Final





 Lung - Tissue 


 


 04/07/19 09:02 Gram Stain - Final





 Other - Tissue 


 


 04/07/19 09:02 Gram Stain - Final





 Lung - Tissue 


 


 04/07/19 09:06 Gram Stain - Final





 Pleural Fluid - Aspirate 


 


 04/04/19 09:36  - Final





 Sputum, Induced/Suctioned Sputum Culture - Final








 Laboratory Results





 04/08/19 04:36 





 04/08/19 04:36 





 











 04/07/19 04/08/19 04/09/19





 05:59 05:59 05:59


 


Intake Total  1450 


 


Output Total  270 


 


Balance  1180 








 











PT  15.7 SEC (12.0-15.0)  H  04/02/19  14:05    


 


INR  1.31  (0.83-1.16)  H  04/02/19  14:05    














ICD10 Worksheet


Patient Problems: 


 Problems











Problem Status Onset


 


Pneumonia Acute

## 2019-04-08 NOTE — HOSPPROG
Hospitalist Progress Note


Assessment/Plan: 





*  Empyema - previous thoracentesis with pH 7.0


   -reaccumulating fluid, persistent fever, now loculated


   -surgery consulted - now s/p VATS


   -cultures and pleural biopsy arranged by ID


   -TB still in differential but off isolation due to sputum negative


*  Sepsis


   -still with persistent fever - now improved


   -back on antibiotics - Ceftriaxone, Flagyl


*  Acute respiratory failure


   -previous resp rate 45 with 10L O2 requirement


   -improved


*  DM II - uncontrolled - last HgA1c 13


   -Lantus


*  HTN


   -restart home meds


*  


   -suggests more prolonged disease process than just parapneumonic effusion (

ie TB)














Subjective: No new complaints.


Objective: 


 Vital Signs











Temp Pulse Resp BP Pulse Ox


 


 37.1 C   93   20   113/73   94 


 


 04/08/19 15:08  04/08/19 15:08  04/08/19 15:08  04/08/19 15:08  04/08/19 15:08








 Microbiology











 04/02/19 16:25 Gram Stain - Final





 Thoracic Fluid - Aspirate 


 


 04/07/19 09:02 Gram Stain - Final





 Lung - Tissue 


 


 04/07/19 09:02 Gram Stain - Final





 Other - Tissue 


 


 04/07/19 09:06 Gram Stain - Final





 Pleural Fluid - Aspirate 


 


 04/07/19 09:06 Mycobacterial Smear ([facility]) - Final





 Pleural Fluid - Aspirate 


 


 04/07/19 09:02 Mycobacterial Smear ([facility]) - Final





 Lung - Tissue 


 


 04/07/19 09:02 Mycobacterial Smear ([facility]) - Final





 Lung - Tissue 








 Laboratory Results





 04/08/19 04:36 





 04/08/19 04:36 





 











 04/07/19 04/08/19 04/09/19





 05:59 05:59 05:59


 


Intake Total  1450 


 


Output Total  270 


 


Balance  1180 








 











PT  15.7 SEC (12.0-15.0)  H  04/02/19  14:05    


 


INR  1.31  (0.83-1.16)  H  04/02/19  14:05    














- Time Spent With Patient


Time Spent with Patient: greater than 35 minutes (discussed and completed 

disability and FMLA paperwork with son)


Time Spent with Patient: Greater than 35 minutes spent on this patients care, 

greater than 50% of time spent counseling, educating, and coordinating care 

regarding the above mentioned plan.





- Physical Exam


Cardiovascular: regular rate and rhythym, no murmur, rub, or gallop


Respiratory: no respiratory distress, no rales or rhonchi, clear to auscultation


Gastrointestinal: normoactive bowel sounds, soft, non-tender abdomen, no 

palpable masses


Skin: no rashes or abrasions, no fluctuance, no induration


Neurologic: AAOx3, sensation intact bilaterally


Psychiatric: interacting appropriately, not anxious, not encephalopathic, 

thought process linear





ICD10 Worksheet


Patient Problems: 


 Problems











Problem Status Onset


 


Pneumonia Acute

## 2019-04-08 NOTE — PCMIDPN
Assessment/Plan: 


Assessment: 58-year-old man with community onset pneumonia manifesting with 

ongoing high temperature spikes without a tachycardic response, acute non blood 

loss anemia, normal LFTs, normal platelet count, pericardial effusion, 

parapneumonic pleural effusion, and hyponatremia.  Patient is behaving 

effectively like a fever of unknown origin with no instability with fevers.  

Once he has recovered from his surgery further diagnostic evaluation will 

continue as an outpatient with close ID follow-up.  If this is in fact pleural 

TB causing empyema, likelihood of seeing organisms on pathology stain is low 

due to pauci-bacillary disease.





1. Probable FUO with left-sided empyema; diagnostic testing in process


2. Community onset pneumonia, LLL


3. Hypoxia secondary to #2 requiring supplemental oxygen


4. Acute anemia without blood loss; stable


5. Diabetes mellitus; poorly controlled


5. Hyponatremia; resolved


6. Left-sided parapneumonic effusion; status post left-sided VATS with 

decortication and wedge biopsy 4/7/2019


7. Pericardial effusion; stable


8. Immunocompromised patient due to poorly controlled diabetes





Plan:


1. Continue ceftriaxone and metronidazole for today


2. Will follow pathology results


3. Completion of diagnostic evaluation will be completed as an outpatient if a 

definitive diagnosis not found prior to chest tube removal and adequate 

recovery from surgery


4. Reviewed in detail potential side effects of beta-lactam antibiotics to 

include: allergy, rash, nausea, antibiotic-associated diarrhea, Clostridioides 

difficile colitis.


5. Infectious disease follow-up within a week of discharge will be arranged  





Yevgeniy Roy MD


Infectious Diseases





04/08/19 10:01





Subjective: 


Fever pattern continues approximately once per day with diaphoresis and chills 

that resolved spontaneously.  Had a few loose bowel movements after drinking 

oral contrast for his abdominal CT without is resolved.  Has some looser stools 

with antibiotics that have resumed with resumption of antibiotics.  No new 

arthralgias, myalgias, abdominal pain, or rash.  Left-sided chest tube site 

uncomfortable but tolerable.





Objective: 


 Vital Signs











Temp Pulse Resp BP Pulse Ox


 


 37.0 C   80   20   110/76   96 


 


 04/08/19 07:26  04/08/19 07:26  04/08/19 07:26  04/08/19 07:26  04/08/19 07:26








 Microbiology











 04/07/19 09:06 Mycobacterial Smear ([facility]) - Final





 Pleural Fluid - Aspirate 


 


 04/07/19 09:02 Mycobacterial Smear ([facility]) - Final





 Lung - Tissue 


 


 04/07/19 09:02 Mycobacterial Smear ([facility]) - Final





 Lung - Tissue 


 


 04/07/19 09:02 Gram Stain - Final





 Other - Tissue 


 


 04/07/19 09:02 Gram Stain - Final





 Lung - Tissue 


 


 04/07/19 09:06 Gram Stain - Final





 Pleural Fluid - Aspirate 


 


 04/04/19 09:36  - Final





 Sputum, Induced/Suctioned Sputum Culture - Final








 Laboratory Results





 04/08/19 04:36 





 04/08/19 04:36 





 











 04/07/19 04/08/19 04/09/19





 05:59 05:59 05:59


 


Intake Total  1450 


 


Output Total  270 


 


Balance  1180 








 











ESR  108 MM/HR (0-20)  H  04/04/19  05:13    


 


C-Reactive Protein  235.9 mg/L (<10.0)  H  04/04/19  05:13    








Medications











Generic Name Dose Route Start Last Admin





  Trade Name Freq  PRN Reason Stop Dose Admin


 


Ceftriaxone Sodium/Dextrose  50 mls @ 100 mls/hr  04/07/19 12:00  04/08/19 09:42





  Rocephin 1 Gm (Premix)  IV  05/07/19 11:59  50 mls





  DAILY [facility]   





  Protocol   


 


Metronidazole/Sodium Chloride  100 mls @ 100 mls/hr  04/07/19 11:45  04/08/19 05

:16





  Flagyl 500 Mg (Premix)  IV  05/07/19 11:44  100 mls





  Q8HRS BAL   





  Protocol   








Microbiology





04/07/19 09:06   Pleural Fluid - Aspirate   Mycobacterial Smear ([facility]AN) - Final


04/07/19 09:06   Pleural Fluid - Aspirate   Gram Stain - Final


04/07/19 09:02   Other - Tissue   Gram Stain - Final


04/07/19 09:02   Lung - Tissue   Mycobacterial Smear ([facility]) - Final


04/07/19 09:02   Lung - Tissue   Mycobacterial Smear ([facility]) - Final


04/07/19 09:02   Lung - Tissue   Gram Stain - Final


04/05/19 13:30   Blood   Mycobacterial Smear ([facility]AN) - Final


04/04/19 09:36   Sputum, Induced/Suctioned    - Final


04/04/19 09:36   Sputum, Induced/Suctioned   Sputum Culture - Final


04/04/19 08:50   Sputum, Induced/Suctioned   Mycobacterial Smear ([facility]AN) - Final


04/04/19 08:50   Sputum, Induced/Suctioned   Mycobacterium tuberculosis DNA (

PCR - Final


                              Pcr Negative For M. Tb Complex


04/07/19 09:06   Pleural Fluid - Aspirate   Fungal Culture - Preliminary


04/07/19 09:02   Other - Tissue   Fungal Culture - Preliminary


04/07/19 09:02   Lung - Tissue   Fungal Culture - Preliminary





 Laboratory Tests











  04/02/19 04/02/19 04/03/19





  18:00 18:00 04:46


 


WBC    12.83 H


 


Hgb   


 


Plt Count   


 


Haptoglobin   


 


Creatinine   


 


Cryptococcus Ag Screen   


 


Urine Histoplasma Ag   0.00 


 


HIV (1&2) Ag & Ab Refer   


 


Urine Legionella Ag  Negative  


 


Ur Strep pneumoniae Ag  Negative  


 


TB Blood Test (T-Spot)   














  04/04/19 04/04/19 04/04/19





  05:15 05:15 10:46


 


WBC   


 


Hgb   9.9 L 


 


Plt Count   247 


 


Haptoglobin   


 


Creatinine   


 


Cryptococcus Ag Screen   


 


Urine Histoplasma Ag   


 


HIV (1&2) Ag & Ab Refer    NEGATIVE


 


Urine Legionella Ag   


 


Ur Strep pneumoniae Ag   


 


TB Blood Test (T-Spot)  Pending  














  04/05/19 04/05/19 04/05/19





  04:22 04:22 04:22


 


WBC  12.33 H  


 


Hgb   


 


Plt Count   


 


Haptoglobin   693 H 


 


Creatinine   


 


Cryptococcus Ag Screen    Negative


 


Urine Histoplasma Ag   


 


HIV (1&2) Ag & Ab Refer   


 


Urine Legionella Ag   


 


Ur Strep pneumoniae Ag   


 


TB Blood Test (T-Spot)   














  04/08/19 04/08/19





  04:36 04:36


 


WBC  10.84 H 


 


Hgb  10.8 L 


 


Plt Count  433 H 


 


Haptoglobin  


 


Creatinine   0.7


 


Cryptococcus Ag Screen  


 


Urine Histoplasma Ag  


 


HIV (1&2) Ag & Ab Refer  


 


Urine Legionella Ag  


 


Ur Strep pneumoniae Ag  


 


TB Blood Test (T-Spot)  














- Physical Exam


General Appearance: no apparent distress, non-toxic


EENT: No scleral icterus


Respiratory: other (Diminished breath sounds on the left), No respiratory 

distress, No accessory muscle use


Neck: supple


Cardiac/Chest: No bradycardia, No tachycardia, No diastolic murmur, No systolic 

murmur


Extremities: No inflammation, No swelling, No erythema


Abdomen: non-tender, soft, No distended, No guarding


Skin: No erythema


Neuro/Psych: alert, normal mood/affect, oriented x 3, No confused





ICD10 Worksheet


Patient Problems: 


 Problems











Problem Status Onset


 


Pneumonia Acute

## 2019-04-09 RX ADMIN — AMOXICILLIN AND CLAVULANATE POTASSIUM SCH MG: 875; 125 TABLET, FILM COATED ORAL at 20:38

## 2019-04-09 RX ADMIN — INSULIN LISPRO SCH UNITS: 100 INJECTION, SOLUTION INTRAVENOUS; SUBCUTANEOUS at 14:28

## 2019-04-09 RX ADMIN — BENZONATATE PRN MG: 100 CAPSULE, LIQUID FILLED ORAL at 18:30

## 2019-04-09 RX ADMIN — INSULIN LISPRO SCH: 100 INJECTION, SOLUTION INTRAVENOUS; SUBCUTANEOUS at 08:39

## 2019-04-09 RX ADMIN — GUAIFENESIN SCH MG: 600 TABLET, EXTENDED RELEASE ORAL at 08:51

## 2019-04-09 RX ADMIN — INSULIN LISPRO SCH: 100 INJECTION, SOLUTION INTRAVENOUS; SUBCUTANEOUS at 20:38

## 2019-04-09 RX ADMIN — ATORVASTATIN CALCIUM SCH MG: 10 TABLET, FILM COATED ORAL at 20:37

## 2019-04-09 RX ADMIN — LISINOPRIL AND HYDROCHLOROTHIAZIDE SCH EA: 10; 12.5 TABLET ORAL at 08:50

## 2019-04-09 RX ADMIN — IBUPROFEN PRN MG: 600 TABLET ORAL at 14:44

## 2019-04-09 RX ADMIN — MENTHOL SCH PATCH: 1 SPRAY TOPICAL at 08:51

## 2019-04-09 RX ADMIN — INSULIN LISPRO SCH UNITS: 100 INJECTION, SOLUTION INTRAVENOUS; SUBCUTANEOUS at 18:30

## 2019-04-09 RX ADMIN — INSULIN GLARGINE SCH UNITS: 100 INJECTION, SOLUTION SUBCUTANEOUS at 08:47

## 2019-04-09 RX ADMIN — INSULIN GLARGINE SCH UNITS: 100 INJECTION, SOLUTION SUBCUTANEOUS at 20:38

## 2019-04-09 NOTE — PCMIDPN
Assessment/Plan: 


1. 58-year-old [place] male with poorly controlled diabetes, admitted with 

several weeks of feeling poorly with sweats, headaches, and left-sided 

exudative pleural effusion with minimal lung involvement status post VATS:


Spent quite some time down in pathology reviewing slides with Dr. Cy Leach and Kimani Cross.  There is no evidence of malignancy, and pleural rind 

showed necrotic debris with no necrotizing granulomas, per se, and specimen was 

also packed with neutrophils.  Dr. Leach, Dr. Cross and I decided that we 

should send this pleural rind to [facility] for multiplex PCR.  

, and Jose from pathology will make this happen.  Asked him to 

please put this in [employer] so it could be visible.  Regarding antibiotic plan 

moving forward, will treat with Augmentin 875 p. O. Twice daily, (stop date to 

be determined), as patient could still have a bacterial process with a 

fastidious organism that is difficult to grow.  Will discontinue ceftriaxone 

and metronidazole.


He will follow-up in our clinic with  Wednesday, April 17th at 10:30 

a.m..  Suspect patient can go home tomorrow, which is the family's preference.  

Today, will have nutrition see him, and have him walk physical therapy.  Will 

also need to set up home oxygen.  Talked about antibiotic associated diarrhea, 

and C difficile colitis today.  All questions were answered.








Over 1 hr was spent with this patient today, talking with the family, reviewing 

pathology slides, and coordinating care.

















Subjective: 


Spent quite some time with the patient today, talking about plan, and reviewing 

pathology slides/microbiology.  Patient still has some discomfort in his left 

chest, although his fever curve is coming down.  Also is complaining of a 

bitter taste in his mouth, likely associated with metronidazole.





Objective: 


 Vital Signs


Ceftriaxone 1 g IV daily day 3


Metronidazole 500 mg IV q.8 hours day 3


T-max 37.9 degrees








Temp Pulse Resp BP Pulse Ox


 


 37.0 C   93   16   124/75 H  96 


 


 04/09/19 08:27  04/09/19 08:27  04/09/19 08:27  04/09/19 08:50  04/09/19 08:27








 Microbiology











 04/02/19 16:25 Gram Stain - Final





 Thoracic Fluid - Aspirate 


 


 04/07/19 09:02 Gram Stain - Final





 Lung - Tissue 


 


 04/07/19 09:02 Gram Stain - Final





 Other - Tissue 


 


 04/07/19 09:06 Gram Stain - Final





 Pleural Fluid - Aspirate 








 Laboratory Results





 04/08/19 04:36 





 04/08/19 04:36 





 











 04/08/19 04/09/19 04/10/19





 05:59 05:59 05:59


 


Intake Total 1450  


 


Output Total 270  


 


Balance 1180  








 











ESR  108 MM/HR (0-20)  H  04/04/19  05:13    


 


C-Reactive Protein  235.9 mg/L (<10.0)  H  04/04/19  05:13    








April 5th AFB blood culture pending


April 7th:  Lung tissue:  No polys or organisms, culture pending, fungal 

culture pending, wedge a biopsy of the lung AFB stain negative, culture pending


April 7th pleural rind:  Gram stain no polys and organisms, culture pending 

fungal culture pending, AFB smear negative, culture pending:


April 7th pleural fluid 4+ polys no organisms culture pending


AFB stain negative, culture pending, fungal culture pending


Pathology pleural fluid no malignant cells


 Laboratory Tests











  04/02/19 04/04/19 04/04/19





  18:00 05:15 10:46


 


Cryptococcus Ag Screen   


 


Urine Histoplasma Ag  0.00  


 


HIV (1&2) Ag & Ab Refer    NEGATIVE


 


TB Blood Test (T-Spot)   NEGATIVE 














  04/05/19





  04:22


 


Cryptococcus Ag Screen  Negative


 


Urine Histoplasma Ag 


 


HIV (1&2) Ag & Ab Refer 


 


TB Blood Test (T-Spot) 














- Physical Exam


General Appearance: alert, no apparent distress


EENT: pharynx normal, No thrush


Respiratory: other (Diminished breath sounds left base.  VATS incision looks 

clean.  Previous chest tube site is dressed and I did not take this down.)


Cardiac/Chest: regular rate, rhythm, No systolic murmur


Extremities: No pedal edema


Skin: No rash


Neuro/Psych: oriented x 3





ICD10 Worksheet


Patient Problems: 


 Problems











Problem Status Onset


 


Pneumonia Acute

## 2019-04-09 NOTE — SOAPPROG
SOAP Progress Note


Assessment/Plan: 


Assessment:








POD # 2 s.p L VATS decortication and wedge biopsy for diagnosis


Cultures and path negative for malignancy or organisms. Will be sent out to  

for PCR per ID


No Pneumo on CXR


Pain controlled


Discussed importance of ambulation and incentive spirometer with patient. 

Provided extensive teaching on how IS will help re-expand lung, as well as how 

to use IS, and had patient demonstrate correct usage. 


Tmax 37.9 last night, received APAP x 1 dose, temp has been steadily trending 

downward since. Is currently afebrile at the time of this note.


Plan today is encourage ambulation, aggressive incentive spirometry. Plan d/c 

home tomorrow if afebrile and successfully weaned off O2. 





Subjective: Patient reports feeling more pain today than yesterday. (Of note, 

patient switched from narcotics yesterday to NSAIDs today, with first dose 

today at 14:40)





General: Very pleasant, well-nourished gentleman in NAD, less diaphoretic than 

when last seen by me.


HENT: Atraumatic, normocephalic, no scleral icterus, pupils round and equal.


Respiratory: Significantly diminished air movement of Left lung superior and 

inferior fields. Occasional crackles bilaterally. 


Cardiovascular: RRR no M/R/G appreciated


Skin: dressing at site of previous chest tube as well as two lap sites dry 

without drainage, redness or inflammation of surrounding tissues. 


psych: mood, affect normal. Very smiley














Plan:





04/07/19 15:39





04/09/19 17:18





04/09/19 17:23





04/09/19 17:27





04/10/19 07:35





Objective: 





 Vital Signs











Temp Pulse Resp BP Pulse Ox


 


 36.8 C   93   16   125/69 H  92 


 


 04/09/19 12:05  04/09/19 12:05  04/09/19 12:05  04/09/19 12:05  04/09/19 12:05








 Microbiology











 04/02/19 16:25 Gram Stain - Final





 Thoracic Fluid - Aspirate Anaerobic Culture - Final


 


 04/07/19 09:02 Mycobacterial Smear (GHASSAN) - Final





 Lung - Tissue 


 


 04/07/19 09:02 Mycobacterial Smear (GHASSAN) - Final





 Lung - Tissue 


 


 04/07/19 09:06 Mycobacterial Smear (GHASSAN) - Final





 Pleural Fluid - Aspirate 


 


 04/05/19 13:30 Mycobacterial Smear ([facility]ASSAN) - Final





 Blood 


 


 04/04/19 08:50 Mycobacterial Smear ([facility]ASSAN) - Final





 Sputum, Induced/Suctioned Mycobacterium tuberculosis DNA (PCR - Final





    Pcr Negative For M. Tb Complex


 


 04/02/19 16:25 Mycobacterial Smear (GHASSAN) - Final





 Thoracic Fluid - Aspirate 


 


 04/07/19 09:02 Gram Stain - Final





 Lung - Tissue 


 


 04/07/19 09:02 Gram Stain - Final





 Other - Tissue 


 


 04/07/19 09:06 Gram Stain - Final





 Pleural Fluid - Aspirate 








 Laboratory Results





 04/08/19 04:36 





 04/08/19 04:36 





 











 04/08/19 04/09/19 04/10/19





 05:59 05:59 05:59


 


Intake Total 1450  


 


Output Total 270  


 


Balance 1180  








 











PT  15.7 SEC (12.0-15.0)  H  04/02/19  14:05    


 


INR  1.31  (0.83-1.16)  H  04/02/19  14:05    














ICD10 Worksheet


Patient Problems: 


 Problems











Problem Status Onset


 


Pneumonia Acute

## 2019-04-09 NOTE — HOSPPROG
Hospitalist Progress Note


Assessment/Plan: 





*  Empyema - thoracentesis with pH 7.0


   -reaccumulated fluid, persistent fever, loculated


   -surgery consulted - s/p VATS


   -cultures and pleural biopsy pending - send out PCR as prelim here 

unrevealing


   -TB still in differential but off isolation due to sputum negative


   -per ID change to PO Augmentin and okay for DC home in am


   -f/u ID as outpatient regarding final pleural biopsy results


*  Sepsis


   -persistent fever - now improved


   -Augmentin as above


*  Acute respiratory failure


   -previous resp rate 45 with 10L O2 requirement


   -improved


   -will likely need home O2


*  DM II - uncontrolled - last HgA1c 13


   -Lantus


*  HTN


   -restart home meds


*  


   -suggests more prolonged disease process than just parapneumonic effusion (

ie TB)














Subjective: Feel much better except for cough


Objective: 


 Vital Signs











Temp Pulse Resp BP Pulse Ox


 


 36.7 C   93   20   120/67   95 


 


 04/09/19 15:44  04/09/19 15:44  04/09/19 15:44  04/09/19 15:44  04/09/19 15:44








 Microbiology











 04/07/19 09:02 Gram Stain - Final





 Lung - Tissue 


 


 04/07/19 09:02 Gram Stain - Final





 Other - Tissue 


 


 04/07/19 09:06 Gram Stain - Final





 Pleural Fluid - Aspirate 


 


 04/02/19 16:25 Gram Stain - Final





 Thoracic Fluid - Aspirate Anaerobic Culture - Final


 


 04/07/19 09:02 Mycobacterial Smear ([facility]) - Final





 Lung - Tissue 


 


 04/07/19 09:02 Mycobacterial Smear ([facility]) - Final





 Lung - Tissue 


 


 04/07/19 09:06 Mycobacterial Smear ([facility]) - Final





 Pleural Fluid - Aspirate 


 


 04/05/19 13:30 Mycobacterial Smear ([facility]) - Final





 Blood 


 


 04/04/19 08:50 Mycobacterial Smear ([facility]) - Final





 Sputum, Induced/Suctioned Mycobacterium tuberculosis DNA (PCR - Final





    Pcr Negative For M. Tb Complex


 


 04/02/19 16:25 Mycobacterial Smear ([facility]) - Final





 Thoracic Fluid - Aspirate 








 Laboratory Results





 04/08/19 04:36 





 04/08/19 04:36 





 











 04/08/19 04/09/19 04/10/19





 05:59 05:59 05:59


 


Intake Total 1450  


 


Output Total 270  


 


Balance 1180  








 











PT  15.7 SEC (12.0-15.0)  H  04/02/19  14:05    


 


INR  1.31  (0.83-1.16)  H  04/02/19  14:05    








d/w Dr. Crum regarding discharge plan


path report reviewed - negative for malignancy


CXR - negative for recurrent PTX





- Physical Exam


Constitutional: no apparent distress, appears nourished, not in pain


Cardiovascular: regular rate and rhythym, no murmur, rub, or gallop


Respiratory: no respiratory distress, no rales or rhonchi, clear to auscultation


Gastrointestinal: normoactive bowel sounds, soft, non-tender abdomen, no 

palpable masses


Skin: no rashes or abrasions, no fluctuance, no induration


Neurologic: AAOx3, sensation intact bilaterally


Psychiatric: interacting appropriately, not anxious, not encephalopathic, 

thought process linear





ICD10 Worksheet


Patient Problems: 


 Problems











Problem Status Onset


 


Pneumonia Acute

## 2019-04-10 VITALS — DIASTOLIC BLOOD PRESSURE: 64 MMHG | SYSTOLIC BLOOD PRESSURE: 110 MMHG

## 2019-04-10 RX ADMIN — INSULIN GLARGINE SCH UNITS: 100 INJECTION, SOLUTION SUBCUTANEOUS at 08:33

## 2019-04-10 RX ADMIN — AMOXICILLIN AND CLAVULANATE POTASSIUM SCH MG: 875; 125 TABLET, FILM COATED ORAL at 08:31

## 2019-04-10 RX ADMIN — MENTHOL SCH PATCH: 1 SPRAY TOPICAL at 08:33

## 2019-04-10 RX ADMIN — LISINOPRIL AND HYDROCHLOROTHIAZIDE SCH EA: 10; 12.5 TABLET ORAL at 08:32

## 2019-04-10 RX ADMIN — BENZONATATE PRN MG: 100 CAPSULE, LIQUID FILLED ORAL at 02:10

## 2019-04-10 RX ADMIN — INSULIN LISPRO SCH: 100 INJECTION, SOLUTION INTRAVENOUS; SUBCUTANEOUS at 13:18

## 2019-04-10 RX ADMIN — INSULIN LISPRO SCH: 100 INJECTION, SOLUTION INTRAVENOUS; SUBCUTANEOUS at 08:45

## 2019-04-10 NOTE — HOSPPROG
Hospitalist Progress Note


Assessment/Plan: 








58 M w empyema





? pneumothorax: repeat PA and lateral cxr does not exonerate ptx


   CT now


   patient stable clinically





*  Empyema - thoracentesis with pH 7.0


   -reaccumulated fluid, persistent fever, loculated


   -surgery consulted - s/p VATS


   -cultures and pleural biopsy pending - send out PCR as prelim here 

unrevealing


   -TB still in differential but off isolation due to sputum negative


   -per ID change to PO Augmentin and okay for DC home in am


   -f/u ID as outpatient regarding final pleural biopsy results


*  Sepsis


   -persistent fever - now improved


   -Augmentin as above


*  Acute respiratory failure


   -previous resp rate 45 with 10L O2 requirement


   -improved


   -will likely need home O2


*  DM II - uncontrolled - last HgA1c 13


   -Lantus


*  HTN


   -restart home meds


*  


   -suggests more prolonged disease process than just parapneumonic effusion (

ie TB)








home today if no R sided pneumothorax


> 30 minutes spent on dc





Subjective: case d/w dr lozano.  cxr's s/o R sided pneumothorax (interp by me)


Objective: 


 Vital Signs











Temp Pulse Resp BP Pulse Ox


 


 36.8 C   85   16   120/80   92 


 


 04/10/19 08:27  04/10/19 08:27  04/10/19 08:27  04/10/19 08:32  04/10/19 08:27








 Microbiology











 04/07/19 09:02 Gram Stain - Final





 Lung - Tissue 


 


 04/07/19 09:06 Gram Stain - Final





 Pleural Fluid - Aspirate 


 


 04/07/19 09:02 Gram Stain - Final





 Other - Tissue 


 


 04/04/19 08:50 Mycobacterial Smear ([facility]) - Final





 Sputum, Induced/Suctioned Mycobacterium tuberculosis DNA (PCR - Final





    Pcr Negative For M. Tb Complex


 


 04/02/19 16:25 Gram Stain - Final





 Thoracic Fluid - Aspirate Anaerobic Culture - Final


 


 04/07/19 09:02 Mycobacterial Smear ([facility]) - Final





 Lung - Tissue 


 


 04/07/19 09:02 Mycobacterial Smear ([facility]) - Final





 Lung - Tissue 


 


 04/07/19 09:06 Mycobacterial Smear ([facility]) - Final





 Pleural Fluid - Aspirate 


 


 04/05/19 13:30 Mycobacterial Smear ([facility]) - Final





 Blood 


 


 04/02/19 16:25 Mycobacterial Smear ([facility]) - Final





 Thoracic Fluid - Aspirate 








 Laboratory Results





 04/08/19 04:36 





 04/08/19 04:36 





 











PT  15.7 SEC (12.0-15.0)  H  04/02/19  14:05    


 


INR  1.31  (0.83-1.16)  H  04/02/19  14:05    














- Physical Exam


Constitutional: no apparent distress, appears nourished


Eyes: PERRL, anicteric sclera


Ears, Nose, Mouth, Throat: moist mucous membranes, hearing normal


Cardiovascular: regular rate and rhythym, no murmur, rub, or gallop


Respiratory: no respiratory distress, no rales or rhonchi


Gastrointestinal: normoactive bowel sounds, soft, non-tender abdomen


Genitourinary: no bladder fullness, No toro in urethra


Skin: warm, normal color


Musculoskeletal: full muscle strength


Neurologic: AAOx3





ICD10 Worksheet


Patient Problems: 


 Problems











Problem Status Onset


 


Pneumonia Acute

## 2019-04-10 NOTE — GDS
[f rep st]



                                                             DISCHARGE SUMMARY





DISCHARGE DIAGNOSES:  

1.  Empyema.

2.  History of diabetes with poor control, A1c of 13.

3.  Hyperlipidemia.

4.  Hypertension.



HOSPITAL COURSE:  He presented with productive cough, brown sputum, fever, chills, sweats.  Initial e
valuation demonstrated no pulmonary thromboembolic disease, small left-sided effusion.  The following
 day he had thoracentesis with a pH of 7.0, and elevated white count.  Micro grew out nothing and ult
imately went to VATS with pleural biopsy.  Initial micro is negative.  The patient was seen by Infect
ious Disease who treated with cefepime and doxycycline. 



The patient completed that course of antibiotics and currently on Augmentin.  He underwent VATS.  Krisitn
st tube removed.  On room air on the day of discharge.  There was some discussion of pneumothorax and
 chest x-rays, but this was exoneration by CT. 



There was some concern of tuberculosis given that he is Nep[place]ase.  He had sputum that was negative f
or mycobacterium tuberculosis on PCR.  There was a bunch of lung tissue and other micro that is pendi
ng.  He has outpatient followup with Infectious Disease and Dr. Cantrell who performed the VATS.



NEW DISCHARGE MEDICATIONS:  Augmentin, I gave him a month.



FOLLOWUP:  He has outpatient followup with Infectious Disease.





Job #:  [id number]

## 2019-04-10 NOTE — ASMTCMCOM
CM Note

 

CM Note                       

Notes:

Chart reviewed for discharge. Pt will d/c independent CM available if needed.



PLAN: Discharge to home Independently

 

Date Signed:  04/10/2019 01:15 PM

Electronically Signed By:Susy Reis

## 2019-04-10 NOTE — ASMTLACE
LACE

 

Length of stay for            Answers:  7-13 days                             

current admission                                                             

Acuity / Level of             Answers:  Yes                                   

Care: Did the patient                                                         

have an inpatient                                                             

admission?                                                                    

Comorbidities - select        Answers:  Diabetes (uncontrolled or             

all that apply                          controlled)                           

                                        Other                         Notes:  HTN

# of Emergency department     Answers:  1-2                                   

visits in the last 6                                                          

months                                                                        

Score: 11

 

Date Signed:  04/10/2019 01:10 PM

Electronically Signed By:Susy Reis

## 2019-04-15 NOTE — PQFORM
PHYSICIAN QUERY FORM

 

 Needs Your Response





This query form is being sent to you to assure this patient record is coded 
properly.  Please respond to the question below:



 QUESTION:



Dr Salgado 



Chart documentation reflects Diabetes Out of Control but Discharge states 
Diabetes with Poor Control

Please clarify whether patient has:

 



X__  Diabetes Uncontrolled



__  Diabetes with poor control



__  Other (please specify _________________________________________________)



__  Unable to determine





Thank You 



Jemima HANSON

 





INSTRUCTIONS FOR RESPONSE:



Answer question by clicking on the "Edit Document" button.

Move cursor to area below the stars.

When complete, hit "Save."

Click on the "Sign" button, then click "Sign" again.

Type in your PIN and hit "Enter."



****************

MTDD

## 2019-04-15 NOTE — PQFORM
PHYSICIAN QUERY FORM

 

 Needs Your Response





This query form is being sent to you to assure this patient record is coded 
properly.  Please respond to the question below:



 QUESTION:



Dr Salgado



Sepsis was documented throughout this patients chart but was not mentioned in 
the Discharge Summary.

Did this patient have Sepsis ?



_X_  Yes



__  No



__  Other (Please Specify_______________________________________________________
___)



__  Unable to be determined





Thank You 

Jemima HANSON

 





INSTRUCTIONS FOR RESPONSE:



Answer question by clicking on the "Edit Document" button.

Move cursor to area below the stars.

When complete, hit "Save."

Click on the "Sign" button, then click "Sign" again.

Type in your PIN and hit "Enter."



****************

MTDD

## 2019-04-15 NOTE — PQFORM
PHYSICIAN QUERY FORM

 

 Needs Your Response





This query form is being sent to you to assure this patient record is coded 
properly.  Please respond to the question below:



 QUESTION:



Dr Salgado



Acute Hypoxic Respiratory Failure was documented throughout this patients 
record but was not mentioned in the Discharge Summary

Did this patient have AHRF ?



_X_  Yes



__  No



__  Other (Please Specify ______________________________________________________
_____)



__  Unable to determine







Thank You

 

Jemima HANSON









INSTRUCTIONS FOR RESPONSE:



Answer question by clicking on the "Edit Document" button.

Move cursor to area below the stars.

When complete, hit "Save."

Click on the "Sign" button, then click "Sign" again.

Type in your PIN and hit "Enter."



****************

MTDD

## 2019-04-15 NOTE — PQFORM
PHYSICIAN QUERY FORM

 

 Needs Your Response





This query form is being sent to you to assure this patient record is coded 
properly.  Please respond to the question below:



 QUESTION:



Dr Salgado



Chart documentation indicates Acute Blood Loss Anemia but this diagnosis was 
not mentioned in the Discharge Summary

Did this patient have ABLA?



X__  Yes



__  No



__  Other Please Specify _______________________________________________)



__  Unable to determine



Thank You 



Jemima HANSON











INSTRUCTIONS FOR RESPONSE:



Answer question by clicking on the "Edit Document" button.

Move cursor to area below the stars.

When complete, hit "Save."

Click on the "Sign" button, then click "Sign" again.

Type in your PIN and hit "Enter."



****************

MTDD

## 2019-04-17 ENCOUNTER — HOSPITAL ENCOUNTER (OUTPATIENT)
Dept: HOSPITAL 80 - FIMAGING | Age: 59
End: 2019-04-17
Attending: INTERNAL MEDICINE
Payer: COMMERCIAL

## 2019-04-17 DIAGNOSIS — J86.9: Primary | ICD-10-CM

## 2019-04-30 ENCOUNTER — HOSPITAL ENCOUNTER (OUTPATIENT)
Dept: HOSPITAL 80 - FIMAGING | Age: 59
End: 2019-04-30
Attending: INTERNAL MEDICINE
Payer: COMMERCIAL

## 2019-04-30 DIAGNOSIS — R91.8: Primary | ICD-10-CM

## 2019-06-05 ENCOUNTER — HOSPITAL ENCOUNTER (OUTPATIENT)
Dept: HOSPITAL 80 - FIMAGING | Age: 59
End: 2019-06-05
Payer: COMMERCIAL